# Patient Record
Sex: MALE | Race: WHITE | NOT HISPANIC OR LATINO | Employment: FULL TIME | URBAN - METROPOLITAN AREA
[De-identification: names, ages, dates, MRNs, and addresses within clinical notes are randomized per-mention and may not be internally consistent; named-entity substitution may affect disease eponyms.]

---

## 2020-11-30 ENCOUNTER — OFFICE VISIT (OUTPATIENT)
Dept: PRIMARY CARE | Facility: CLINIC | Age: 35
End: 2020-11-30
Payer: COMMERCIAL

## 2020-11-30 VITALS
DIASTOLIC BLOOD PRESSURE: 86 MMHG | RESPIRATION RATE: 16 BRPM | SYSTOLIC BLOOD PRESSURE: 128 MMHG | OXYGEN SATURATION: 98 % | HEIGHT: 72 IN | HEART RATE: 72 BPM | WEIGHT: 230 LBS | TEMPERATURE: 97.2 F | BODY MASS INDEX: 31.15 KG/M2

## 2020-11-30 DIAGNOSIS — Z00.00 WELLNESS EXAMINATION: ICD-10-CM

## 2020-11-30 DIAGNOSIS — Z23 IMMUNIZATION DUE: Primary | ICD-10-CM

## 2020-11-30 DIAGNOSIS — F98.8 ATTENTION DEFICIT DISORDER, UNSPECIFIED HYPERACTIVITY PRESENCE: ICD-10-CM

## 2020-11-30 DIAGNOSIS — E78.00 PURE HYPERCHOLESTEROLEMIA: ICD-10-CM

## 2020-11-30 PROBLEM — F90.9 ADHD (ATTENTION DEFICIT HYPERACTIVITY DISORDER): Status: ACTIVE | Noted: 2020-11-30

## 2020-11-30 PROCEDURE — 90471 IMMUNIZATION ADMIN: CPT | Performed by: FAMILY MEDICINE

## 2020-11-30 PROCEDURE — 90686 IIV4 VACC NO PRSV 0.5 ML IM: CPT | Performed by: FAMILY MEDICINE

## 2020-11-30 PROCEDURE — 99385 PREV VISIT NEW AGE 18-39: CPT | Mod: 25 | Performed by: FAMILY MEDICINE

## 2020-11-30 RX ORDER — DEXTROAMPHETAMINE SACCHARATE, AMPHETAMINE ASPARTATE MONOHYDRATE, DEXTROAMPHETAMINE SULFATE AND AMPHETAMINE SULFATE 5; 5; 5; 5 MG/1; MG/1; MG/1; MG/1
20 CAPSULE, EXTENDED RELEASE ORAL
COMMUNITY
Start: 2011-12-21 | End: 2021-01-11

## 2020-11-30 RX ORDER — ETODOLAC 400 MG/1
400 TABLET, FILM COATED ORAL
COMMUNITY
Start: 2012-09-11 | End: 2020-11-30 | Stop reason: HOSPADM

## 2021-01-11 ENCOUNTER — TELEMEDICINE (OUTPATIENT)
Dept: PRIMARY CARE | Facility: CLINIC | Age: 36
End: 2021-01-11
Payer: COMMERCIAL

## 2021-01-11 DIAGNOSIS — F90.2 ATTENTION DEFICIT HYPERACTIVITY DISORDER (ADHD), COMBINED TYPE: Primary | ICD-10-CM

## 2021-01-11 PROCEDURE — 99213 OFFICE O/P EST LOW 20 MIN: CPT | Mod: 95 | Performed by: FAMILY MEDICINE

## 2021-01-11 RX ORDER — DEXTROAMPHETAMINE SACCHARATE, AMPHETAMINE ASPARTATE MONOHYDRATE, DEXTROAMPHETAMINE SULFATE AND AMPHETAMINE SULFATE 2.5; 2.5; 2.5; 2.5 MG/1; MG/1; MG/1; MG/1
10 CAPSULE, EXTENDED RELEASE ORAL EVERY MORNING
Qty: 30 CAPSULE | Refills: 0 | Status: SHIPPED | OUTPATIENT
Start: 2021-01-11 | End: 2021-02-25 | Stop reason: SDUPTHER

## 2021-01-11 ASSESSMENT — ENCOUNTER SYMPTOMS
WHEEZING: 0
SORE THROAT: 0
COUGH: 0
PALPITATIONS: 0
ACTIVITY CHANGE: 0
CHILLS: 0
APPETITE CHANGE: 0
FATIGUE: 0
TROUBLE SWALLOWING: 0
EYE ITCHING: 0
EYE PAIN: 0
SINUS PAIN: 0
FEVER: 0
EYE DISCHARGE: 0
VOICE CHANGE: 0
CHEST TIGHTNESS: 0
SHORTNESS OF BREATH: 0
EYE REDNESS: 0
DECREASED CONCENTRATION: 1

## 2021-01-11 NOTE — PROGRESS NOTES
Verification of Patient Location:  The patient affirms they are currently located in the following state: [place]    Request for Consent:    Video Encounter   Yoel, my name is Adrianadoroteo Pete DO.  Before we proceed, can you please verify your identification by telling me your full name and date of birth?  Can you tell me who is in the room with you?    You and I are about to have a telemedicine check-in or visit because you have requested it.  This is a live video-conference.  I am a real person, speaking to you in real time.  There is no one else with me on the video-conference.  However, when we use ([employer], [employer], etc) it is important for you to know that the video-conference may not be secure or private.  I am not recording this conversation and I am asking you not to record it.  This telemedicine visit will be billed to your health insurance or you, if you are self-insured.  You understand you will be responsible for any copayments or coinsurances that apply to your telemedicine visit.  Communication platform used for this encounter:  Integrated Zoom via [employer] Video Visit     Before starting our telemedicine visit, I am required to get your consent for this virtual check-in or visit by telemedicine. Do you consent?      Patient Response to Request for Consent:  Yes      Visit Documentation:  Subjective   ADD    Discussion.     Patient ID: Artur Soni is a 35 y.o. male.  1985      HPI   Pt was seen in late Nov for follow up of ADD med and  Has not been on meds for years and with covid and increased workload at home having more difficulty with getting work done.   Pt has had job changes and little tricks to get him thorugh are not working now.     Also hoping to keep to lowest dose avail that will work.  Pt was on 20mg at one time.     Pt will sign med pact when in office     The following have been reviewed and updated as appropriate in this visit:  Allergies  Meds  Problems        Review of Systems   Constitutional: Negative for activity change, appetite change, chills, fatigue and fever.   HENT: Negative for congestion, ear pain, mouth sores, postnasal drip, sinus pain, sore throat, trouble swallowing and voice change.    Eyes: Negative for pain, discharge, redness, itching and visual disturbance.   Respiratory: Negative for cough, chest tightness, shortness of breath and wheezing.    Cardiovascular: Negative for chest pain and palpitations.   Psychiatric/Behavioral: Positive for decreased concentration.         Assessment/Plan   Diagnoses and all orders for this visit:    Attention deficit hyperactivity disorder (ADHD), combined type (Primary)    Other orders  -     amphetamine-dextroamphetamine XR (ADDERALL XR) 10 mg 24 hr capsule; Take 1 capsule (10 mg total) by mouth every morning.        Time Spent:  I spent 8 minutes on this date of service performing the following activities: providing counseling and education.

## 2021-01-12 NOTE — ASSESSMENT & PLAN NOTE
--discussed with pt and will give trial of adderall xr at 10mg daily.   --will follow up in 4 weeks as recheck.     Pt will sign med pact form once in office as visit.   Pt aware and agree with pan of care

## 2021-02-24 LAB
ALBUMIN SERPL-MCNC: 5 G/DL (ref 4–5)
ALBUMIN/GLOB SERPL: 1.8 {RATIO} (ref 1.2–2.2)
ALP SERPL-CCNC: 93 IU/L (ref 39–117)
ALT SERPL-CCNC: 50 IU/L (ref 0–44)
AST SERPL-CCNC: 30 IU/L (ref 0–40)
BILIRUB SERPL-MCNC: 0.5 MG/DL (ref 0–1.2)
BUN SERPL-MCNC: 17 MG/DL (ref 6–20)
BUN/CREAT SERPL: 17 (ref 9–20)
CALCIUM SERPL-MCNC: 10.2 MG/DL (ref 8.7–10.2)
CHLORIDE SERPL-SCNC: 99 MMOL/L (ref 96–106)
CHOLEST SERPL-MCNC: 241 MG/DL (ref 100–199)
CO2 SERPL-SCNC: 25 MMOL/L (ref 20–29)
CREAT SERPL-MCNC: 1.01 MG/DL (ref 0.76–1.27)
ERYTHROCYTE [DISTWIDTH] IN BLOOD BY AUTOMATED COUNT: 13.1 % (ref 11.6–15.4)
GLOBULIN SER CALC-MCNC: 2.8 G/DL (ref 1.5–4.5)
GLUCOSE SERPL-MCNC: 90 MG/DL (ref 65–99)
HCT VFR BLD AUTO: 46.1 % (ref 37.5–51)
HDLC SERPL-MCNC: 44 MG/DL
HGB BLD-MCNC: 15.4 G/DL (ref 13–17.7)
LAB CORP EGFR IF AFRICN AM: 111 ML/MIN/1.73
LAB CORP EGFR IF NONAFRICN AM: 96 ML/MIN/1.73
LDLC SERPL CALC-MCNC: 165 MG/DL (ref 0–99)
MCH RBC QN AUTO: 28.3 PG (ref 26.6–33)
MCHC RBC AUTO-ENTMCNC: 33.4 G/DL (ref 31.5–35.7)
MCV RBC AUTO: 85 FL (ref 79–97)
PLATELET # BLD AUTO: 250 X10E3/UL (ref 150–450)
POTASSIUM SERPL-SCNC: 4.5 MMOL/L (ref 3.5–5.2)
PROT SERPL-MCNC: 7.8 G/DL (ref 6–8.5)
RBC # BLD AUTO: 5.45 X10E6/UL (ref 4.14–5.8)
SODIUM SERPL-SCNC: 140 MMOL/L (ref 134–144)
TRIGL SERPL-MCNC: 175 MG/DL (ref 0–149)
VLDLC SERPL CALC-MCNC: 32 MG/DL (ref 5–40)
WBC # BLD AUTO: 7.9 X10E3/UL (ref 3.4–10.8)

## 2021-02-25 LAB
APPEARANCE UR: CLEAR
BACTERIA #/AREA URNS HPF: NORMAL /[HPF]
BILIRUB UR QL STRIP: NEGATIVE
COLOR UR: YELLOW
EPI CELLS #/AREA URNS HPF: NORMAL /HPF (ref 0–10)
GLUCOSE UR QL: NEGATIVE
HGB UR QL STRIP: NEGATIVE
KETONES UR QL STRIP: NEGATIVE
LAB CORP URINALYSIS REFLEX: NORMAL
LEUKOCYTE ESTERASE UR QL STRIP: NEGATIVE
MICRO URNS: NORMAL
MICRO URNS: NORMAL
MUCOUS THREADS URNS QL MICRO: PRESENT
NITRITE UR QL STRIP: NEGATIVE
PH UR STRIP: 6 [PH] (ref 5–7.5)
PROT UR QL STRIP: NEGATIVE
RBC #/AREA URNS HPF: NORMAL /HPF (ref 0–2)
SP GR UR: 1.02 (ref 1–1.03)
UROBILINOGEN UR STRIP-MCNC: 0.2 MG/DL (ref 0.2–1)
WBC #/AREA URNS HPF: NORMAL /HPF (ref 0–5)

## 2021-02-25 NOTE — TELEPHONE ENCOUNTER
Medicine Refill Request    Last Office Visit: 11/30/2020  Last Telemedicine Visit: 1/11/2021 Adriana Pete DO    Next Office Visit: Visit date not found  Next Telemedicine Visit: Visit date not found         Current Outpatient Medications:   •  amphetamine-dextroamphetamine XR (ADDERALL XR) 10 mg 24 hr capsule, Take 1 capsule (10 mg total) by mouth every morning., Disp: 30 capsule, Rfl: 0      BP Readings from Last 3 Encounters:   11/30/20 128/86       Recent Lab results:  Lab Results   Component Value Date    CHOL 241 (H) 02/24/2021   ,   Lab Results   Component Value Date    HDL 44 02/24/2021   ,   Lab Results   Component Value Date    LDLCALC 165 (H) 02/24/2021   ,   Lab Results   Component Value Date    TRIG 175 (H) 02/24/2021        Lab Results   Component Value Date    GLUCOSE 90 02/24/2021   , No results found for: HGBA1C      Lab Results   Component Value Date    CREATININE 1.01 02/24/2021       No results found for: TSH

## 2021-02-27 RX ORDER — DEXTROAMPHETAMINE SACCHARATE, AMPHETAMINE ASPARTATE MONOHYDRATE, DEXTROAMPHETAMINE SULFATE AND AMPHETAMINE SULFATE 2.5; 2.5; 2.5; 2.5 MG/1; MG/1; MG/1; MG/1
10 CAPSULE, EXTENDED RELEASE ORAL EVERY MORNING
Qty: 30 CAPSULE | Refills: 0 | Status: SHIPPED | OUTPATIENT
Start: 2021-02-27 | End: 2021-04-19 | Stop reason: SDUPTHER

## 2021-04-20 RX ORDER — DEXTROAMPHETAMINE SACCHARATE, AMPHETAMINE ASPARTATE MONOHYDRATE, DEXTROAMPHETAMINE SULFATE AND AMPHETAMINE SULFATE 2.5; 2.5; 2.5; 2.5 MG/1; MG/1; MG/1; MG/1
10 CAPSULE, EXTENDED RELEASE ORAL EVERY MORNING
Qty: 30 CAPSULE | Refills: 0 | Status: SHIPPED | OUTPATIENT
Start: 2021-04-20 | End: 2021-06-14 | Stop reason: SDUPTHER

## 2021-06-08 ENCOUNTER — HOSPITAL ENCOUNTER (OUTPATIENT)
Facility: CLINIC | Age: 36
Discharge: HOME | End: 2021-06-08
Attending: FAMILY MEDICINE
Payer: COMMERCIAL

## 2021-06-08 VITALS
OXYGEN SATURATION: 95 % | DIASTOLIC BLOOD PRESSURE: 86 MMHG | TEMPERATURE: 98.3 F | HEART RATE: 61 BPM | SYSTOLIC BLOOD PRESSURE: 138 MMHG

## 2021-06-08 DIAGNOSIS — H60.332 ACUTE SWIMMER'S EAR OF LEFT SIDE: Primary | ICD-10-CM

## 2021-06-08 PROCEDURE — 69209 REMOVE IMPACTED EAR WAX UNI: CPT | Mod: 50 | Performed by: NURSE PRACTITIONER

## 2021-06-08 PROCEDURE — 99212 OFFICE O/P EST SF 10 MIN: CPT | Mod: 25 | Performed by: NURSE PRACTITIONER

## 2021-06-08 RX ORDER — NEOMYCIN SULFATE, POLYMYXIN B SULFATE AND HYDROCORTISONE 10; 3.5; 1 MG/ML; MG/ML; [USP'U]/ML
3 SUSPENSION/ DROPS AURICULAR (OTIC) 3 TIMES DAILY
Qty: 4.5 ML | Refills: 0 | Status: SHIPPED | OUTPATIENT
Start: 2021-06-08 | End: 2021-06-18

## 2021-06-08 ASSESSMENT — ENCOUNTER SYMPTOMS
ADENOPATHY: 0
CHEST TIGHTNESS: 0
WEAKNESS: 0
NAUSEA: 0
SHORTNESS OF BREATH: 0
SORE THROAT: 0
SINUS PAIN: 0
BACK PAIN: 0
DIZZINESS: 0
FEVER: 0
SINUS PRESSURE: 0
WHEEZING: 0
CHILLS: 0
DIARRHEA: 0
CONSTIPATION: 0
ABDOMINAL PAIN: 0
RHINORRHEA: 0
HEADACHES: 0
NUMBNESS: 0
VOMITING: 0
COUGH: 0
MYALGIAS: 0

## 2021-06-08 NOTE — ED ATTESTATION NOTE
I was immediately available to provide supervision and direction for the care of the patient.     Callie Purcell MD  06/08/21 [id number]

## 2021-06-08 NOTE — ED PROVIDER NOTES
Emergency Medicine Note  HPI   HISTORY OF PRESENT ILLNESS     36 y/o male c/o b/l ear pain. Reports he was swimming 4 days ago and felt like he got water in his b/l ears. Unrelieved by OTC swimaid and debrox. Denies fever, chills, sore throat, cough, dizziness, ear trauma, or rash. COVID vaccinated.            Patient History   PAST HISTORY     Reviewed from Nursing Triage:      Past Medical History:   Diagnosis Date   • ADHD (attention deficit hyperactivity disorder)        Past Surgical History:   Procedure Laterality Date   • KNEE ARTHROSCOPY W/ MENISCAL REPAIR Right        Family History   Problem Relation Age of Onset   • No Known Problems Biological Mother    • Heart disease Biological Father    • Valvular heart disease Biological Father    • Early death Biological Father    • No Known Problems Biological Sister    • No Known Problems Maternal Grandmother    • Parkinsonism Maternal Grandfather    • Parkinsonism Paternal Grandmother    • Dementia Paternal Grandmother    • No Known Problems Paternal Grandfather    • Heart disease Other        Social History     Tobacco Use   • Smoking status: Never Smoker   • Smokeless tobacco: Never Used   Vaping Use   • Vaping Use: Never used   Substance Use Topics   • Alcohol use: Yes   • Drug use: Never         Review of Systems   REVIEW OF SYSTEMS     Review of Systems   Constitutional: Negative for chills and fever.   HENT: Positive for ear pain. Negative for congestion, ear discharge, postnasal drip, rhinorrhea, sinus pressure, sinus pain and sore throat.    Respiratory: Negative for cough, chest tightness, shortness of breath and wheezing.    Cardiovascular: Negative for chest pain.   Gastrointestinal: Negative for abdominal pain, constipation, diarrhea, nausea and vomiting.   Musculoskeletal: Negative for back pain and myalgias.   Skin: Negative for rash.   Neurological: Negative for dizziness, weakness, numbness and headaches.   Hematological: Negative for adenopathy.    All other systems reviewed and are negative.        VITALS     ED Vitals    Date/Time Temp Pulse Resp BP SpO2 [facility]   06/08/21 1318 36.8 °C (98.3 °F) 61 -- 138/86 95 % MG                       Physical Exam   PHYSICAL EXAM     Physical Exam  Vitals reviewed.   Constitutional:       Appearance: He is well-developed. He is not ill-appearing or toxic-appearing.   HENT:      Head: Normocephalic and atraumatic.      Right Ear: Tympanic membrane and external ear normal. No drainage, swelling or tenderness. There is impacted cerumen. Tympanic membrane is not erythematous or bulging.      Left Ear: Tympanic membrane and external ear normal. Tenderness (ear canal) present. No drainage or swelling. There is impacted cerumen. Tympanic membrane is not erythematous or bulging.      Nose: Nose normal.      Mouth/Throat:      Lips: Pink.      Mouth: Mucous membranes are moist.      Pharynx: Oropharynx is clear. Uvula midline.      Tonsils: 1+ on the right. 1+ on the left.   Cardiovascular:      Rate and Rhythm: Normal rate and regular rhythm.      Heart sounds: No murmur heard.     Pulmonary:      Effort: Pulmonary effort is normal. No respiratory distress.      Breath sounds: Normal breath sounds. No wheezing.   Musculoskeletal:         General: Normal range of motion.      Cervical back: Normal range of motion.   Skin:     General: Skin is warm and dry.      Findings: No rash.   Neurological:      Mental Status: He is alert and oriented to person, place, and time.   Psychiatric:         Behavior: Behavior is cooperative.         Thought Content: Thought content normal.           PROCEDURES     Ear Cerumen Removal    Date/Time: 6/8/2021 1:30 PM  Performed by: Raya Vasquez CRNP  Authorized by: Callie Purcell MD     Consent:     Consent obtained:  Verbal    Consent given by:  Patient    Risks discussed:  Bleeding, dizziness, incomplete removal, infection, pain and TM perforation  Procedure details:     Location:   R ear and L ear    Procedure type: irrigation    Post-procedure details:     Inspection:  TM intact    Hearing quality:  Improved    Patient tolerance of procedure:  Tolerated well, no immediate complications         DATA     Results     None              No orders to display       Scoring tools                                 ED Course & MDM   MDM / ED COURSE and CLINICAL IMPRESSIONS     MDM  Number of Diagnoses or Management Options  Acute swimmer's ear of left side: new and does not require workup  Diagnosis management comments: History and exam consistent with left otitis externa.  Will rx cortisporin drops, 3 gtts TID for 10 days.  Instructed pt to avoid putting objects in ear such as ear buds/qtips, do not swim until infection has cleared.  Recommend tylenol/motrin as needed for pain.  Followup with primary care provider.  Strict instructions given to pt if sx worsen or otherwise concerned to return or visit local ED.  Pt verbalized understanding and agrees with plan of care.      Patient Progress  Patient progress: stable      Clinical Impressions as of Jun 08 1340   Acute swimmer's ear of left side            Raya Vasquez CRNP  06/08/21 1343

## 2021-06-08 NOTE — DISCHARGE INSTRUCTIONS
Administer 4 drops of cortisporin in your left ear 3 times a day for 10 days.  Avoid putting objects in your ear such as ear buds/qtips. Do not swim until infection has cleared.  Take tylenol/motrin as needed for pain.    Followup with your primary care provider.  If your symptoms worsen or otherwise concerned, please visit your local emergency department.    Thank you for choosing [facility]!

## 2021-06-14 NOTE — TELEPHONE ENCOUNTER
PDMP please      Medicine Refill Request    Last Office Visit: 11/30/2020  Last Telemedicine Visit: 1/11/2021 Adriana Pete DO    Next Office Visit: Visit date not found  Next Telemedicine Visit: Visit date not found         Current Outpatient Medications:   •  amphetamine-dextroamphetamine XR (ADDERALL XR) 10 mg 24 hr capsule, Take 1 capsule (10 mg total) by mouth every morning., Disp: 30 capsule, Rfl: 0  •  neomycin-polymyxin-hydrocortisone (CORTISPORIN) 3.5-10,000-1 mg/mL-unit/mL-% otic suspension, Administer 3 drops into the left ear 3 (three) times a day for 10 days., Disp: 4.5 mL, Rfl: 0      BP Readings from Last 3 Encounters:   06/08/21 138/86   11/30/20 128/86       Recent Lab results:  Lab Results   Component Value Date    CHOL 241 (H) 02/24/2021   ,   Lab Results   Component Value Date    HDL 44 02/24/2021   ,   Lab Results   Component Value Date    LDLCALC 165 (H) 02/24/2021   ,   Lab Results   Component Value Date    TRIG 175 (H) 02/24/2021        Lab Results   Component Value Date    GLUCOSE 90 02/24/2021   , No results found for: HGBA1C      Lab Results   Component Value Date    CREATININE 1.01 02/24/2021       No results found for: TSH

## 2021-06-16 RX ORDER — DEXTROAMPHETAMINE SACCHARATE, AMPHETAMINE ASPARTATE MONOHYDRATE, DEXTROAMPHETAMINE SULFATE AND AMPHETAMINE SULFATE 2.5; 2.5; 2.5; 2.5 MG/1; MG/1; MG/1; MG/1
10 CAPSULE, EXTENDED RELEASE ORAL EVERY MORNING
Qty: 30 CAPSULE | Refills: 0 | Status: SHIPPED | OUTPATIENT
Start: 2021-06-16 | End: 2021-08-02 | Stop reason: SDUPTHER

## 2021-08-02 RX ORDER — DEXTROAMPHETAMINE SACCHARATE, AMPHETAMINE ASPARTATE MONOHYDRATE, DEXTROAMPHETAMINE SULFATE AND AMPHETAMINE SULFATE 2.5; 2.5; 2.5; 2.5 MG/1; MG/1; MG/1; MG/1
10 CAPSULE, EXTENDED RELEASE ORAL EVERY MORNING
Qty: 30 CAPSULE | Refills: 0 | Status: SHIPPED | OUTPATIENT
Start: 2021-08-02 | End: 2021-09-23 | Stop reason: SDUPTHER

## 2021-08-02 NOTE — TELEPHONE ENCOUNTER
adderall XR 10mg 06/16/21 #30  Medicine Refill Request    Last Office Visit: 11/30/2020  Last Telemedicine Visit: 1/11/2021 Adriana Pete DO    Next Office Visit: Visit date not found  Next Telemedicine Visit: Visit date not found         Current Outpatient Medications:   •  amphetamine-dextroamphetamine XR (ADDERALL XR) 10 mg 24 hr capsule, Take 1 capsule (10 mg total) by mouth every morning., Disp: 30 capsule, Rfl: 0      BP Readings from Last 3 Encounters:   06/08/21 138/86   11/30/20 128/86       Recent Lab results:  Lab Results   Component Value Date    CHOL 241 (H) 02/24/2021   ,   Lab Results   Component Value Date    HDL 44 02/24/2021   ,   Lab Results   Component Value Date    LDLCALC 165 (H) 02/24/2021   ,   Lab Results   Component Value Date    TRIG 175 (H) 02/24/2021        Lab Results   Component Value Date    GLUCOSE 90 02/24/2021   , No results found for: HGBA1C      Lab Results   Component Value Date    CREATININE 1.01 02/24/2021       No results found for: TSH

## 2021-08-02 NOTE — PROGRESS NOTES
I have queried the state Prescription Drug Monitoring Program [PDMP] and found no suspicious PDMP activity and I will prescribe a controlled medication(s).

## 2021-09-23 RX ORDER — DEXTROAMPHETAMINE SACCHARATE, AMPHETAMINE ASPARTATE MONOHYDRATE, DEXTROAMPHETAMINE SULFATE AND AMPHETAMINE SULFATE 2.5; 2.5; 2.5; 2.5 MG/1; MG/1; MG/1; MG/1
10 CAPSULE, EXTENDED RELEASE ORAL EVERY MORNING
Qty: 30 CAPSULE | Refills: 0 | Status: SHIPPED | OUTPATIENT
Start: 2021-09-23 | End: 2021-11-02 | Stop reason: SDUPTHER

## 2021-09-23 NOTE — TELEPHONE ENCOUNTER
PDMP          Medicine Refill Request    Last Office Visit: 11/30/2020  Last Telemedicine Visit: 1/11/2021 Adriana Pete DO    Next Office Visit: Visit date not found  Next Telemedicine Visit: Visit date not found         Current Outpatient Medications:   •  amphetamine-dextroamphetamine XR (ADDERALL XR) 10 mg 24 hr capsule, Take 1 capsule (10 mg total) by mouth every morning., Disp: 30 capsule, Rfl: 0      BP Readings from Last 3 Encounters:   06/08/21 138/86   11/30/20 128/86       Recent Lab results:  Lab Results   Component Value Date    CHOL 241 (H) 02/24/2021   ,   Lab Results   Component Value Date    HDL 44 02/24/2021   ,   Lab Results   Component Value Date    LDLCALC 165 (H) 02/24/2021   ,   Lab Results   Component Value Date    TRIG 175 (H) 02/24/2021        Lab Results   Component Value Date    GLUCOSE 90 02/24/2021   , No results found for: HGBA1C      Lab Results   Component Value Date    CREATININE 1.01 02/24/2021       No results found for: TSH

## 2021-11-03 RX ORDER — DEXTROAMPHETAMINE SACCHARATE, AMPHETAMINE ASPARTATE MONOHYDRATE, DEXTROAMPHETAMINE SULFATE AND AMPHETAMINE SULFATE 2.5; 2.5; 2.5; 2.5 MG/1; MG/1; MG/1; MG/1
10 CAPSULE, EXTENDED RELEASE ORAL EVERY MORNING
Qty: 30 CAPSULE | Refills: 0 | Status: SHIPPED | OUTPATIENT
Start: 2021-11-03 | End: 2021-12-15 | Stop reason: SDUPTHER

## 2021-11-22 ENCOUNTER — OFFICE VISIT (OUTPATIENT)
Dept: PRIMARY CARE | Facility: CLINIC | Age: 36
End: 2021-11-22
Payer: COMMERCIAL

## 2021-11-22 VITALS
HEART RATE: 64 BPM | WEIGHT: 243 LBS | RESPIRATION RATE: 16 BRPM | DIASTOLIC BLOOD PRESSURE: 78 MMHG | HEIGHT: 72 IN | TEMPERATURE: 98 F | BODY MASS INDEX: 32.91 KG/M2 | SYSTOLIC BLOOD PRESSURE: 134 MMHG | OXYGEN SATURATION: 98 %

## 2021-11-22 DIAGNOSIS — Z00.00 ENCOUNTER FOR GENERAL ADULT MEDICAL EXAMINATION WITHOUT ABNORMAL FINDINGS: Primary | ICD-10-CM

## 2021-11-22 DIAGNOSIS — F90.2 ATTENTION DEFICIT HYPERACTIVITY DISORDER (ADHD), COMBINED TYPE: ICD-10-CM

## 2021-11-22 DIAGNOSIS — E78.2 MIXED HYPERLIPIDEMIA: ICD-10-CM

## 2021-11-22 DIAGNOSIS — K21.9 GASTROESOPHAGEAL REFLUX DISEASE WITHOUT ESOPHAGITIS: ICD-10-CM

## 2021-11-22 PROCEDURE — 3008F BODY MASS INDEX DOCD: CPT | Performed by: FAMILY MEDICINE

## 2021-11-22 PROCEDURE — 90715 TDAP VACCINE 7 YRS/> IM: CPT | Performed by: FAMILY MEDICINE

## 2021-11-22 PROCEDURE — 90472 IMMUNIZATION ADMIN EACH ADD: CPT | Performed by: FAMILY MEDICINE

## 2021-11-22 PROCEDURE — 99395 PREV VISIT EST AGE 18-39: CPT | Mod: 25 | Performed by: FAMILY MEDICINE

## 2021-11-22 PROCEDURE — 90686 IIV4 VACC NO PRSV 0.5 ML IM: CPT | Performed by: FAMILY MEDICINE

## 2021-11-22 PROCEDURE — 90471 IMMUNIZATION ADMIN: CPT | Performed by: FAMILY MEDICINE

## 2021-11-22 ASSESSMENT — ENCOUNTER SYMPTOMS
BLOOD IN STOOL: 0
COUGH: 0
SINUS PRESSURE: 0
PHOTOPHOBIA: 0
COLOR CHANGE: 0
DIARRHEA: 0
NAUSEA: 0
EYE ITCHING: 0
EYE PAIN: 0
APPETITE CHANGE: 0
CONSTIPATION: 0
BACK PAIN: 0
FEVER: 0
PALPITATIONS: 0
DIZZINESS: 0
ACTIVITY CHANGE: 0
EYE DISCHARGE: 0
LIGHT-HEADEDNESS: 0
FREQUENCY: 0
UNEXPECTED WEIGHT CHANGE: 0
AGITATION: 0
DYSURIA: 0
VOICE CHANGE: 0
VOMITING: 1
JOINT SWELLING: 0
CHEST TIGHTNESS: 0
ABDOMINAL DISTENTION: 0
DYSPHORIC MOOD: 0
WEAKNESS: 0
HYPERACTIVE: 0
NERVOUS/ANXIOUS: 0
WHEEZING: 0
HEADACHES: 0
DIFFICULTY URINATING: 0
BRUISES/BLEEDS EASILY: 0
SLEEP DISTURBANCE: 0
SINUS PAIN: 0
MYALGIAS: 0
NUMBNESS: 0
FATIGUE: 0
SHORTNESS OF BREATH: 0
SORE THROAT: 0
EYE REDNESS: 0
ARTHRALGIAS: 0
ABDOMINAL PAIN: 0

## 2021-11-22 ASSESSMENT — PATIENT HEALTH QUESTIONNAIRE - PHQ9: SUM OF ALL RESPONSES TO PHQ9 QUESTIONS 1 & 2: 0

## 2021-11-22 NOTE — ASSESSMENT & PLAN NOTE
Pt did complete b/w recently through his insurance ---states values are improved.   ---will recheck with his values from earlier in year and hoping to improve weight loss and and exercise regimen in the next few mths.     Will forward values

## 2021-11-22 NOTE — PROGRESS NOTES
Adriana Pete D.O.  [facility] in [address], [address]  [place]  [address]  [phone]  Fax [phone]        History of Present Illness   Patient ID: Artur Soni is a 35 y.o. male.    Subjective   EPP    HPI   34 yo here for his physical states overall feeling ok.   No complaints of chest pain or sob.   States has been crazy with wife as has n/born at home--has had some weight gain and noticed more reflux.   --stom has not been great---  Did have 2 episodes of vomitting about a mth apart but daughter alsos tarted  and unsure if related.  No symptoms of nausea/vomiitng or diarrhea at thist yaakov.     Biometric screening done by life insurance. Values were improved per patient         Past Medical/Surgical/Family/Social History     The following have been reviewed and updated as appropriate in this visit:  Meds  Problems         Past Medical History:   Diagnosis Date   • ADHD (attention deficit hyperactivity disorder)        Past Surgical History:   Procedure Laterality Date   • KNEE ARTHROSCOPY W/ MENISCAL REPAIR Right        Family History   Problem Relation Age of Onset   • No Known Problems Biological Mother    • Heart disease Biological Father    • Valvular heart disease Biological Father    • Early death Biological Father    • No Known Problems Biological Sister    • No Known Problems Maternal Grandmother    • Parkinsonism Maternal Grandfather    • Parkinsonism Paternal Grandmother    • Dementia Paternal Grandmother    • No Known Problems Paternal Grandfather    • Heart disease Mother's Sister    • No Known Problems Biological Son    • No Known Problems Biological Daughter        Social History     Socioeconomic History   • Marital status: Single     Spouse name: Not on file   • Number of children: 2   • Years of education: Not on file   • Highest education level: Not on file   Occupational History   • Occupation:    Tobacco Use   •  Smoking status: Never Smoker   • Smokeless tobacco: Never Used   Vaping Use   • Vaping Use: Never used   Substance and Sexual Activity   • Alcohol use: Yes     Alcohol/week: 5.0 standard drinks     Types: 5 Standard drinks or equivalent per week     Comment: Socially, about 5 drinks per week   • Drug use: Never   • Sexual activity: Yes     Partners: Female     Birth control/protection: Condom   Other Topics Concern   • Not on file   Social History Narrative   • Not on file     Social Determinants of Health     Financial Resource Strain:    • Difficulty of Paying Living Expenses: Not on file   Food Insecurity:    • Worried About Running Out of Food in the Last Year: Not on file   • Ran Out of Food in the Last Year: Not on file   Transportation Needs:    • Lack of Transportation (Medical): Not on file   • Lack of Transportation (Non-Medical): Not on file   Physical Activity:    • Days of Exercise per Week: Not on file   • Minutes of Exercise per Session: Not on file   Stress:    • Feeling of Stress : Not on file   Social Connections:    • Frequency of Communication with Friends and Family: Not on file   • Frequency of Social Gatherings with Friends and Family: Not on file   • Attends [employer] Services: Not on file   • Active Member of Clubs or Organizations: Not on file   • Attends Club or Organization Meetings: Not on file   • Marital Status: Not on file   Intimate Partner Violence:    • Fear of Current or Ex-Partner: Not on file   • Emotionally Abused: Not on file   • Physically Abused: Not on file   • Sexually Abused: Not on file   Housing Stability:    • Unable to Pay for Housing in the Last Year: Not on file   • Number of Places Lived in the Last Year: Not on file   • Unstable Housing in the Last Year: Not on file      Allergies and Medications     No Known Allergies      Current Outpatient Medications:   •  amphetamine-dextroamphetamine XR 10 mg 24 hr capsule, Take 1 capsule (10 mg total) by mouth every  "morning., Disp: 30 capsule, Rfl: 0   Review of Systems       Review of Systems   Constitutional: Negative for activity change, appetite change, fatigue, fever and unexpected weight change.   HENT: Negative for congestion, ear discharge, ear pain, hearing loss, postnasal drip, sinus pressure, sinus pain, sore throat and voice change.    Eyes: Negative for photophobia, pain, discharge, redness, itching and visual disturbance.   Respiratory: Negative for cough, chest tightness, shortness of breath and wheezing.    Cardiovascular: Negative for chest pain and palpitations.   Gastrointestinal: Positive for vomiting. Negative for abdominal distention, abdominal pain, blood in stool, constipation, diarrhea and nausea.        Now resolved.     +reflux   Endocrine: Negative for cold intolerance, heat intolerance and polyuria.   Genitourinary: Negative for difficulty urinating, dysuria, frequency, genital sores, penile discharge, penile pain, penile swelling, scrotal swelling and testicular pain.   Musculoskeletal: Negative for arthralgias, back pain, joint swelling and myalgias.   Skin: Negative for color change and rash.   Allergic/Immunologic: Negative for environmental allergies and food allergies.   Neurological: Negative for dizziness, weakness, light-headedness, numbness and headaches.   Hematological: Does not bruise/bleed easily.   Psychiatric/Behavioral: Negative for agitation, behavioral problems, dysphoric mood, sleep disturbance and suicidal ideas. The patient is not nervous/anxious and is not hyperactive.       Physical Examination       Objective     Vitals:    11/22/21 0808   BP: 134/78   Pulse: 64   Resp: 16   Temp: 36.7 °C (98 °F)   SpO2: 98%       Vitals:    11/22/21 0808   BP: 134/78   BP Location: Left upper arm   Patient Position: Sitting   Pulse: 64   Resp: 16   Temp: 36.7 °C (98 °F)   TempSrc: Temporal   SpO2: 98%   Weight: 110 kg (243 lb)   Height: 1.816 m (5' 11.5\")       Wt Readings from Last 3 " "Encounters:   11/22/21 110 kg (243 lb)   11/30/20 104 kg (230 lb)       Ht Readings from Last 3 Encounters:   11/22/21 1.816 m (5' 11.5\")   11/30/20 1.829 m (6')       BP Readings from Last 3 Encounters:   11/22/21 134/78   06/08/21 138/86   11/30/20 128/86       Physical Exam  Vitals and nursing note reviewed.   Constitutional:       Appearance: Normal appearance.   HENT:      Head: Normocephalic and atraumatic.      Right Ear: Tympanic membrane, ear canal and external ear normal.      Left Ear: Tympanic membrane, ear canal and external ear normal.      Nose: Nose normal.      Mouth/Throat:      Mouth: Mucous membranes are moist.      Pharynx: Oropharynx is clear.   Eyes:      Extraocular Movements: Extraocular movements intact.      Conjunctiva/sclera: Conjunctivae normal.      Pupils: Pupils are equal, round, and reactive to light.   Cardiovascular:      Rate and Rhythm: Normal rate and regular rhythm.      Pulses: Normal pulses.      Heart sounds: Normal heart sounds.   Pulmonary:      Effort: Pulmonary effort is normal.      Breath sounds: Normal breath sounds.   Abdominal:      General: Abdomen is flat. Bowel sounds are normal.      Palpations: Abdomen is soft.   Musculoskeletal:         General: Normal range of motion.      Cervical back: Normal range of motion and neck supple.   Skin:     General: Skin is warm and dry.      Capillary Refill: Capillary refill takes less than 2 seconds.   Neurological:      General: No focal deficit present.      Mental Status: He is alert and oriented to person, place, and time.   Psychiatric:         Mood and Affect: Mood normal.         Behavior: Behavior normal.         Thought Content: Thought content normal.         Judgment: Judgment normal.        Laboratory Results     Lab Results   Component Value Date    WBC 7.9 02/24/2021    HGB 15.4 02/24/2021    HCT 46.1 02/24/2021    MCV 85 02/24/2021     02/24/2021          Chemistry        Component Value Date/Time    "  02/24/2021 0930    K 4.5 02/24/2021 0930    CL 99 02/24/2021 0930    CO2 25 02/24/2021 0930    BUN 17 02/24/2021 0930    CREATININE 1.01 02/24/2021 0930        Component Value Date/Time    ALKPHOS 93 02/24/2021 0930    AST 30 02/24/2021 0930    ALT 50 (H) 02/24/2021 0930    BILITOT 0.5 02/24/2021 0930            Lab Results   Component Value Date    GLUCOSE 90 02/24/2021     02/24/2021    K 4.5 02/24/2021    CO2 25 02/24/2021    CL 99 02/24/2021    BUN 17 02/24/2021    CREATININE 1.01 02/24/2021       Lab Results   Component Value Date    CHOL 241 (H) 02/24/2021     Lab Results   Component Value Date    HDL 44 02/24/2021     Lab Results   Component Value Date    LDLCALC 165 (H) 02/24/2021     Lab Results   Component Value Date    TRIG 175 (H) 02/24/2021     No results found for: CHOLHDL    No results found for: TSH    No results found for: HGBA1C    No results found for: PSA    No results found for: HEPCAB      Immunization History   Administered Date(s) Administered   • Influenza TIV (IM) 11/11/2011   • Influenza Vaccine Quadrivalent Preservative Free 6 Mons and Up 11/30/2020   • Influenza Vaccine Quadrivalent Preservative Free 6-35 Months 10/14/2017, 08/14/2018   • Influenza, Unspecified 11/02/2015, 11/30/2020   • Sars-cov-2 (Covid-19) Vaccine, Pfizer 01/29/2021, 02/20/2021   • Tdap 01/01/2013, 08/14/2018          Assessment and Plan       Assessment/Plan     Problem List Items Addressed This Visit        Digestive    Gastroesophageal reflux disease without esophagitis    Current Assessment & Plan     ---pt has had 2 episodes--of vomitting after meal, notices more reflux--will give trial of prilosec otc-- 2 weeks, then prn            Endocrine/Metabolic    Mixed hyperlipidemia    Overview      2/2016    Diet: no regular red meat; +cheese, no regular fried/fast food; does not cook with butter at home; not much full fat dairy  Exercise: ice hockey and running         Current Assessment & Plan      Pt did complete b/w recently through his insurance ---states values are improved.   ---will recheck with his values from earlier in year and hoping to improve weight loss and and exercise regimen in the next few mths.     Will forward values            Other    ADHD (attention deficit hyperactivity disorder)    Current Assessment & Plan     chornic problem and stable on meds         Encounter for general adult medical examination without abnormal findings - Primary    Current Assessment & Plan     Normal Exam.  Updated interval history.  Reviewed medications, allergies, PMH,FH,alcohol/tobacco/drug use.  Diet and exercise counseling given.     -Age appropriate health Maintenance reviewed.    -Immunizations - will updated tdap due to new baby, flu today  --aware to booster covid in 2 weeks if chooses.     - Counseling regarding healthy eating habits and a diet low in saturated fats was given.      --Exercise counseling given.     Patient verbalizes an understnding of the treatment plan discussed at today's visit.                No follow-ups on file.    Orders Placed This Encounter   Procedures   • Influenza vaccine quadrivalent preservative free 6 mon and older IM (FluLaval)   • Tdap vaccine greater than or equal to 6yo IM          Adriana Pete DO  11/22/2021

## 2021-11-22 NOTE — ASSESSMENT & PLAN NOTE
Normal Exam.  Updated interval history.  Reviewed medications, allergies, PMH,FH,alcohol/tobacco/drug use.  Diet and exercise counseling given.     -Age appropriate health Maintenance reviewed.    -Immunizations - will updated tdap due to new baby, flu today  --aware to booster covid in 2 weeks if chooses.     - Counseling regarding healthy eating habits and a diet low in saturated fats was given.      --Exercise counseling given.     Patient verbalizes an understnding of the treatment plan discussed at today's visit.

## 2021-11-22 NOTE — ASSESSMENT & PLAN NOTE
---pt has had 2 episodes--of vomitting after meal, notices more reflux--will give trial of prilosec otc-- 2 weeks, then prn

## 2021-12-15 RX ORDER — DEXTROAMPHETAMINE SACCHARATE, AMPHETAMINE ASPARTATE MONOHYDRATE, DEXTROAMPHETAMINE SULFATE AND AMPHETAMINE SULFATE 2.5; 2.5; 2.5; 2.5 MG/1; MG/1; MG/1; MG/1
10 CAPSULE, EXTENDED RELEASE ORAL EVERY MORNING
Qty: 30 CAPSULE | Refills: 0 | Status: SHIPPED | OUTPATIENT
Start: 2021-12-15 | End: 2022-01-31 | Stop reason: SDUPTHER

## 2021-12-15 NOTE — TELEPHONE ENCOUNTER
Medicine Refill Request    Last Office: 11/22/2021   Last Telemedicine Visit: 1/11/2021 Adriana Pete DO  Last Consult Visit: Visit date not found    Next Office Visit: Visit date not found  Next Telemedicine Visit: Visit date not found         Current Outpatient Medications:   •  amphetamine-dextroamphetamine XR 10 mg 24 hr capsule, Take 1 capsule (10 mg total) by mouth every morning., Disp: 30 capsule, Rfl: 0      BP Readings from Last 3 Encounters:   11/22/21 134/78   06/08/21 138/86   11/30/20 128/86       Recent Lab results:  Lab Results   Component Value Date    CHOL 241 (H) 02/24/2021   ,   Lab Results   Component Value Date    HDL 44 02/24/2021   ,   Lab Results   Component Value Date    LDLCALC 165 (H) 02/24/2021   ,   Lab Results   Component Value Date    TRIG 175 (H) 02/24/2021        Lab Results   Component Value Date    GLUCOSE 90 02/24/2021   , No results found for: HGBA1C      Lab Results   Component Value Date    CREATININE 1.01 02/24/2021       No results found for: TSH

## 2022-01-31 RX ORDER — DEXTROAMPHETAMINE SACCHARATE, AMPHETAMINE ASPARTATE MONOHYDRATE, DEXTROAMPHETAMINE SULFATE AND AMPHETAMINE SULFATE 2.5; 2.5; 2.5; 2.5 MG/1; MG/1; MG/1; MG/1
10 CAPSULE, EXTENDED RELEASE ORAL EVERY MORNING
Qty: 30 CAPSULE | Refills: 0 | Status: SHIPPED | OUTPATIENT
Start: 2022-01-31 | End: 2022-03-11 | Stop reason: SDUPTHER

## 2022-01-31 NOTE — TELEPHONE ENCOUNTER
PDMP        Medicine Refill Request    Last Office: 11/22/2021   Last Consult Visit: Visit date not found  Last Telemedicine Visit: 1/11/2021 Adriana Pete DO    Next Appointment: Visit date not found      Current Outpatient Medications:   •  amphetamine-dextroamphetamine XR 10 mg 24 hr capsule, Take 1 capsule (10 mg total) by mouth every morning., Disp: 30 capsule, Rfl: 0      BP Readings from Last 3 Encounters:   11/22/21 134/78   06/08/21 138/86   11/30/20 128/86       Recent Lab results:  Lab Results   Component Value Date    CHOL 241 (H) 02/24/2021   ,   Lab Results   Component Value Date    HDL 44 02/24/2021   ,   Lab Results   Component Value Date    LDLCALC 165 (H) 02/24/2021   ,   Lab Results   Component Value Date    TRIG 175 (H) 02/24/2021        Lab Results   Component Value Date    GLUCOSE 90 02/24/2021   , No results found for: HGBA1C      Lab Results   Component Value Date    CREATININE 1.01 02/24/2021       No results found for: TSH

## 2022-03-11 NOTE — TELEPHONE ENCOUNTER
PDMP            Medicine Refill Request    Last Office: 11/22/2021   Last Consult Visit: Visit date not found  Last Telemedicine Visit: 1/11/2021 Adriana Pete DO    Next Appointment: Visit date not found      Current Outpatient Medications:   •  amphetamine-dextroamphetamine XR (ADDERALL XR) 10 mg 24 hr capsule, Take 1 capsule (10 mg total) by mouth every morning., Disp: 30 capsule, Rfl: 0      BP Readings from Last 3 Encounters:   11/22/21 134/78   06/08/21 138/86   11/30/20 128/86       Recent Lab results:  Lab Results   Component Value Date    CHOL 241 (H) 02/24/2021   ,   Lab Results   Component Value Date    HDL 44 02/24/2021   ,   Lab Results   Component Value Date    LDLCALC 165 (H) 02/24/2021   ,   Lab Results   Component Value Date    TRIG 175 (H) 02/24/2021        Lab Results   Component Value Date    GLUCOSE 90 02/24/2021   , No results found for: HGBA1C      Lab Results   Component Value Date    CREATININE 1.01 02/24/2021       No results found for: TSH

## 2022-03-13 RX ORDER — DEXTROAMPHETAMINE SACCHARATE, AMPHETAMINE ASPARTATE MONOHYDRATE, DEXTROAMPHETAMINE SULFATE AND AMPHETAMINE SULFATE 2.5; 2.5; 2.5; 2.5 MG/1; MG/1; MG/1; MG/1
10 CAPSULE, EXTENDED RELEASE ORAL EVERY MORNING
Qty: 30 CAPSULE | Refills: 0 | Status: SHIPPED | OUTPATIENT
Start: 2022-03-13 | End: 2022-05-06 | Stop reason: SDUPTHER

## 2022-05-09 RX ORDER — DEXTROAMPHETAMINE SACCHARATE, AMPHETAMINE ASPARTATE MONOHYDRATE, DEXTROAMPHETAMINE SULFATE AND AMPHETAMINE SULFATE 2.5; 2.5; 2.5; 2.5 MG/1; MG/1; MG/1; MG/1
10 CAPSULE, EXTENDED RELEASE ORAL EVERY MORNING
Qty: 30 CAPSULE | Refills: 0 | Status: SHIPPED | OUTPATIENT
Start: 2022-05-09 | End: 2022-06-23 | Stop reason: SDUPTHER

## 2022-06-25 RX ORDER — DEXTROAMPHETAMINE SACCHARATE, AMPHETAMINE ASPARTATE MONOHYDRATE, DEXTROAMPHETAMINE SULFATE AND AMPHETAMINE SULFATE 2.5; 2.5; 2.5; 2.5 MG/1; MG/1; MG/1; MG/1
10 CAPSULE, EXTENDED RELEASE ORAL EVERY MORNING
Qty: 30 CAPSULE | Refills: 0 | Status: SHIPPED | OUTPATIENT
Start: 2022-06-25 | End: 2022-07-25 | Stop reason: SDUPTHER

## 2022-07-25 ENCOUNTER — OFFICE VISIT (OUTPATIENT)
Dept: PRIMARY CARE | Facility: CLINIC | Age: 37
End: 2022-07-25
Payer: COMMERCIAL

## 2022-07-25 VITALS
WEIGHT: 233.2 LBS | BODY MASS INDEX: 31.59 KG/M2 | DIASTOLIC BLOOD PRESSURE: 84 MMHG | OXYGEN SATURATION: 100 % | RESPIRATION RATE: 12 BRPM | TEMPERATURE: 97.2 F | HEART RATE: 64 BPM | HEIGHT: 72 IN | SYSTOLIC BLOOD PRESSURE: 124 MMHG

## 2022-07-25 DIAGNOSIS — E78.2 MIXED HYPERLIPIDEMIA: ICD-10-CM

## 2022-07-25 DIAGNOSIS — F90.2 ATTENTION DEFICIT HYPERACTIVITY DISORDER (ADHD), COMBINED TYPE: Primary | ICD-10-CM

## 2022-07-25 PROBLEM — K21.9 GASTROESOPHAGEAL REFLUX DISEASE WITHOUT ESOPHAGITIS: Status: RESOLVED | Noted: 2021-11-22 | Resolved: 2022-07-25

## 2022-07-25 PROCEDURE — 99213 OFFICE O/P EST LOW 20 MIN: CPT | Performed by: FAMILY MEDICINE

## 2022-07-25 PROCEDURE — 3008F BODY MASS INDEX DOCD: CPT | Performed by: FAMILY MEDICINE

## 2022-07-25 RX ORDER — DEXTROAMPHETAMINE SACCHARATE, AMPHETAMINE ASPARTATE MONOHYDRATE, DEXTROAMPHETAMINE SULFATE AND AMPHETAMINE SULFATE 2.5; 2.5; 2.5; 2.5 MG/1; MG/1; MG/1; MG/1
10 CAPSULE, EXTENDED RELEASE ORAL EVERY MORNING
Qty: 30 CAPSULE | Refills: 0 | Status: SHIPPED | OUTPATIENT
Start: 2022-07-25 | End: 2022-09-06 | Stop reason: SDUPTHER

## 2022-07-25 RX ORDER — DICLOFENAC SODIUM 75 MG/1
75 TABLET, DELAYED RELEASE ORAL 2 TIMES DAILY
COMMUNITY
Start: 2022-06-30 | End: 2023-02-06

## 2022-07-25 NOTE — PROGRESS NOTES
Adriana Pete D.O.  [facility] in [address], [address]  [place]  [address]  [phone]  Fax [phone]        History of Present Illness   Patient ID: Artur Soni is a 36 y.o. male.    Subjective  Pt here for follow up  HPI  +lost weight  On whole 30 and did well, minimal episodes of reflux--pthad done well with dietary changes.  -pt well controlled on medication    Had boot on had grade 2 sprain  --2 weeks ago seems to be getting better     Past Medical/Surgical/Family/Social History     The following have been reviewed and updated as appropriate in this visit:   Allergies  Meds  Problems           Past Medical History:   Diagnosis Date   • ADHD (attention deficit hyperactivity disorder)    • Gastroesophageal reflux disease without esophagitis 11/22/2021       Past Surgical History:   Procedure Laterality Date   • KNEE ARTHROSCOPY W/ MENISCAL REPAIR Right        Family History   Problem Relation Age of Onset   • No Known Problems Biological Mother    • Heart disease Biological Father    • Valvular heart disease Biological Father    • Early death Biological Father    • No Known Problems Biological Sister    • No Known Problems Maternal Grandmother    • Parkinsonism Maternal Grandfather    • Parkinsonism Paternal Grandmother    • Dementia Paternal Grandmother    • No Known Problems Paternal Grandfather    • Heart disease Mother's Sister    • No Known Problems Biological Son    • No Known Problems Biological Daughter        Social History     Socioeconomic History   • Marital status: Single     Spouse name: Not on file   • Number of children: 2   • Years of education: Not on file   • Highest education level: Not on file   Occupational History   • Occupation:    Tobacco Use   • Smoking status: Never Smoker   • Smokeless tobacco: Never Used   Vaping Use   • Vaping Use: Never used   Substance and Sexual Activity   • Alcohol use: Yes      Alcohol/week: 5.0 standard drinks     Types: 5 Standard drinks or equivalent per week     Comment: Socially, about 5 drinks per week   • Drug use: Never   • Sexual activity: Yes     Partners: Female     Birth control/protection: Condom   Other Topics Concern   • Not on file   Social History Narrative   • Not on file     Social Determinants of Health     Financial Resource Strain: Not on file   Food Insecurity: Not on file   Transportation Needs: Not on file   Physical Activity: Not on file   Stress: Not on file   Social Connections: Not on file   Intimate Partner Violence: Not on file   Housing Stability: Not on file      Allergies and Medications     No Known Allergies      Current Outpatient Medications:   •  amphetamine-dextroamphetamine XR (ADDERALL XR) 10 mg 24 hr capsule, Take 1 capsule (10 mg total) by mouth every morning., Disp: 30 capsule, Rfl: 0  •  diclofenac (VOLTAREN) 75 mg EC tablet, Take 75 mg by mouth 2 (two) times a day., Disp: , Rfl:    Review of Systems       Review of Systems   Constitutional: Negative for activity change, appetite change, chills, fatigue and fever.   HENT: Negative for congestion, ear pain, mouth sores, postnasal drip, sinus pain, sore throat, trouble swallowing and voice change.    Eyes: Negative for pain, discharge, redness, itching and visual disturbance.   Respiratory: Negative for cough, chest tightness, shortness of breath and wheezing.    Cardiovascular: Negative for chest pain and palpitations.   Musculoskeletal: Positive for joint swelling.        Ankle swelling   Psychiatric/Behavioral: Positive for decreased concentration.      Physical Examination       Objective     Vitals:    07/25/22 1755   BP: 124/84   Pulse: 64   Resp: 12   Temp: 36.2 °C (97.2 °F)   SpO2: 100%       Vitals:    07/25/22 1755   BP: 124/84   Pulse: 64   Resp: 12   Temp: 36.2 °C (97.2 °F)   SpO2: 100%   Weight: 106 kg (233 lb 3.2 oz)   Height: 1.829 m (6')       Wt Readings from Last 3 Encounters:  "  07/25/22 106 kg (233 lb 3.2 oz)   11/22/21 110 kg (243 lb)   11/30/20 104 kg (230 lb)       Ht Readings from Last 3 Encounters:   07/25/22 1.829 m (6')   11/22/21 1.816 m (5' 11.5\")   11/30/20 1.829 m (6')       BP Readings from Last 3 Encounters:   07/25/22 124/84   11/22/21 134/78   06/08/21 138/86       Physical Exam  Vitals and nursing note reviewed.   Constitutional:       Appearance: Normal appearance. He is well-developed.   HENT:      Head: Normocephalic and atraumatic.      Right Ear: External ear normal.      Left Ear: External ear normal.      Mouth/Throat:      Mouth: Mucous membranes are moist.      Pharynx: Oropharynx is clear.   Eyes:      Extraocular Movements: Extraocular movements intact.      Conjunctiva/sclera: Conjunctivae normal.      Pupils: Pupils are equal, round, and reactive to light.   Cardiovascular:      Rate and Rhythm: Normal rate and regular rhythm.      Heart sounds: Normal heart sounds.   Pulmonary:      Effort: Pulmonary effort is normal.      Breath sounds: Normal breath sounds.   Musculoskeletal:      Cervical back: Normal range of motion and neck supple.   Skin:     General: Skin is warm and dry.   Neurological:      Mental Status: He is alert.   Psychiatric:         Mood and Affect: Mood normal.         Behavior: Behavior normal.         Thought Content: Thought content normal.         Judgment: Judgment normal.        Laboratory Results     Lab Results   Component Value Date    WBC 7.9 02/24/2021    HGB 15.4 02/24/2021    HCT 46.1 02/24/2021    MCV 85 02/24/2021     02/24/2021          Chemistry        Component Value Date/Time     02/24/2021 0930    K 4.5 02/24/2021 0930    CL 99 02/24/2021 0930    CO2 25 02/24/2021 0930    BUN 17 02/24/2021 0930    CREATININE 1.01 02/24/2021 0930        Component Value Date/Time    ALKPHOS 93 02/24/2021 0930    AST 30 02/24/2021 0930    ALT 50 (H) 02/24/2021 0930    BILITOT 0.5 02/24/2021 0930            Lab Results "   Component Value Date    GLUCOSE 90 02/24/2021     02/24/2021    K 4.5 02/24/2021    CO2 25 02/24/2021    CL 99 02/24/2021    BUN 17 02/24/2021    CREATININE 1.01 02/24/2021       Lab Results   Component Value Date    CHOL 241 (H) 02/24/2021     Lab Results   Component Value Date    HDL 44 02/24/2021     Lab Results   Component Value Date    LDLCALC 165 (H) 02/24/2021     Lab Results   Component Value Date    TRIG 175 (H) 02/24/2021     No results found for: CHOLHDL    No results found for: TSH    No results found for: HGBA1C    No results found for: PSA    No results found for: HEPCAB      Immunization History   Administered Date(s) Administered   • Influenza TIV (IM) 11/11/2011   • Influenza Vaccine Quadrivalent Preservative Free 6 Mons and Up 11/30/2020, 11/22/2021   • Influenza Vaccine Quadrivalent Preservative Free 6-35 Months 10/14/2017, 08/14/2018   • Influenza, Unspecified 11/02/2015, 11/30/2020   • Sars-cov-2 (Covid-19) Vaccine, Pfizer 01/29/2021, 02/20/2021   • Tdap 01/01/2013, 08/14/2018, 11/22/2021          Assessment and Plan       Assessment/Plan     Problem List Items Addressed This Visit        Endocrine/Metabolic    Mixed hyperlipidemia    Overview      2/2016    Diet: no regular red meat; +cheese, no regular fried/fast food; does not cook with butter at home; not much full fat dairy  Exercise: ice hockey and running           Current Assessment & Plan     --chronic--will be due for updated labs  Continue on routine diet           Relevant Orders    Comprehensive metabolic panel    Lipid panel       Other    ADHD (attention deficit hyperactivity disorder) - Primary    Current Assessment & Plan     chronic and under treatment with medication and well controlled.  No change in medication           Relevant Medications    amphetamine-dextroamphetamine XR (ADDERALL XR) 10 mg 24 hr capsule          No follow-ups on file.    Orders Placed This Encounter   Procedures   • Comprehensive  metabolic panel     Standing Status:   Future     Number of Occurrences:   1     Standing Expiration Date:   7/25/2023     Order Specific Question:   Release to patient     Answer:   Immediate   • Lipid panel     Standing Status:   Future     Number of Occurrences:   1     Standing Expiration Date:   7/25/2023     Order Specific Question:   Release to patient     Answer:   Immediate          Adriana Pete DO  8/13/2022

## 2022-08-13 ASSESSMENT — ENCOUNTER SYMPTOMS
VOICE CHANGE: 0
JOINT SWELLING: 1
EYE PAIN: 0
SINUS PAIN: 0
TROUBLE SWALLOWING: 0
EYE REDNESS: 0
CHILLS: 0
EYE ITCHING: 0
FATIGUE: 0
COUGH: 0
PALPITATIONS: 0
EYE DISCHARGE: 0
CHEST TIGHTNESS: 0
ACTIVITY CHANGE: 0
DECREASED CONCENTRATION: 1
APPETITE CHANGE: 0
FEVER: 0
SHORTNESS OF BREATH: 0
WHEEZING: 0
SORE THROAT: 0

## 2022-09-06 NOTE — TELEPHONE ENCOUNTER
Medicine Refill Request    07/25/2022 07/25/2022 Adderall Xr (Capsule, Extended Release) 30.0 30 10 MG NA NORMA PETE   06/25/2022 06/25/2022 Adderall Xr (Capsule, Extended Release) 30.0 30 10 MG NA NORMA PETE      Last Office: 7/25/2022   Last Consult Visit: Visit date not found  Last Telemedicine Visit: 1/11/2021 Norma Pete DO    Next Appointment: Visit date not found      Current Outpatient Medications:     amphetamine-dextroamphetamine XR (ADDERALL XR) 10 mg 24 hr capsule, Take 1 capsule (10 mg total) by mouth every morning., Disp: 30 capsule, Rfl: 0    diclofenac (VOLTAREN) 75 mg EC tablet, Take 75 mg by mouth 2 (two) times a day., Disp: , Rfl:       BP Readings from Last 3 Encounters:   07/25/22 124/84   11/22/21 134/78   06/08/21 138/86       Recent Lab results:  Lab Results   Component Value Date    CHOL 241 (H) 02/24/2021   ,   Lab Results   Component Value Date    HDL 44 02/24/2021   ,   Lab Results   Component Value Date    LDLCALC 165 (H) 02/24/2021   ,   Lab Results   Component Value Date    TRIG 175 (H) 02/24/2021        Lab Results   Component Value Date    GLUCOSE 90 02/24/2021   , No results found for: HGBA1C      Lab Results   Component Value Date    CREATININE 1.01 02/24/2021       No results found for: TSH

## 2022-09-07 RX ORDER — DEXTROAMPHETAMINE SACCHARATE, AMPHETAMINE ASPARTATE MONOHYDRATE, DEXTROAMPHETAMINE SULFATE AND AMPHETAMINE SULFATE 2.5; 2.5; 2.5; 2.5 MG/1; MG/1; MG/1; MG/1
10 CAPSULE, EXTENDED RELEASE ORAL EVERY MORNING
Qty: 30 CAPSULE | Refills: 0 | Status: SHIPPED | OUTPATIENT
Start: 2022-09-07 | End: 2022-10-17 | Stop reason: SDUPTHER

## 2022-10-05 ENCOUNTER — APPOINTMENT (OUTPATIENT)
Dept: URBAN - METROPOLITAN AREA CLINIC 203 | Age: 37
Setting detail: DERMATOLOGY
End: 2022-10-12

## 2022-10-05 DIAGNOSIS — L73.8 OTHER SPECIFIED FOLLICULAR DISORDERS: ICD-10-CM

## 2022-10-05 DIAGNOSIS — L57.8 OTHER SKIN CHANGES DUE TO CHRONIC EXPOSURE TO NONIONIZING RADIATION: ICD-10-CM

## 2022-10-05 DIAGNOSIS — D18.0 HEMANGIOMA: ICD-10-CM

## 2022-10-05 DIAGNOSIS — Z11.52 ENCOUNTER FOR SCREENING FOR COVID-19: ICD-10-CM

## 2022-10-05 DIAGNOSIS — D22 MELANOCYTIC NEVI: ICD-10-CM

## 2022-10-05 DIAGNOSIS — L81.4 OTHER MELANIN HYPERPIGMENTATION: ICD-10-CM

## 2022-10-05 PROBLEM — D18.01 HEMANGIOMA OF SKIN AND SUBCUTANEOUS TISSUE: Status: ACTIVE | Noted: 2022-10-05

## 2022-10-05 PROBLEM — D22.5 MELANOCYTIC NEVI OF TRUNK: Status: ACTIVE | Noted: 2022-10-05

## 2022-10-05 PROCEDURE — OTHER SCREENING FOR COVID-19: OTHER

## 2022-10-05 PROCEDURE — OTHER COUNSELING: OTHER

## 2022-10-05 PROCEDURE — OTHER REASSURANCE: OTHER

## 2022-10-05 PROCEDURE — OTHER SUNSCREEN RECOMMENDATIONS: OTHER

## 2022-10-05 PROCEDURE — OTHER MIPS QUALITY: OTHER

## 2022-10-05 PROCEDURE — 99203 OFFICE O/P NEW LOW 30 MIN: CPT

## 2022-10-05 ASSESSMENT — LOCATION DETAILED DESCRIPTION DERM
LOCATION DETAILED: RIGHT LATERAL ABDOMEN
LOCATION DETAILED: LEFT MEDIAL SUPERIOR CHEST
LOCATION DETAILED: LEFT SUPERIOR LATERAL UPPER BACK
LOCATION DETAILED: EPIGASTRIC SKIN
LOCATION DETAILED: RIGHT INFERIOR CENTRAL MALAR CHEEK
LOCATION DETAILED: LEFT MEDIAL INFERIOR CHEST
LOCATION DETAILED: RIGHT SUPERIOR UPPER BACK

## 2022-10-05 ASSESSMENT — LOCATION ZONE DERM
LOCATION ZONE: FACE
LOCATION ZONE: TRUNK

## 2022-10-05 ASSESSMENT — LOCATION SIMPLE DESCRIPTION DERM
LOCATION SIMPLE: RIGHT CHEEK
LOCATION SIMPLE: CHEST
LOCATION SIMPLE: LEFT UPPER BACK
LOCATION SIMPLE: ABDOMEN
LOCATION SIMPLE: RIGHT UPPER BACK

## 2022-10-05 NOTE — PROCEDURE: MIPS QUALITY
Additional Notes: patient has not received flu shot, but plans to
Detail Level: Detailed
Quality 110: Preventive Care And Screening: Influenza Immunization: Influenza Immunization not Administered for Documented Reasons.

## 2022-10-18 RX ORDER — DEXTROAMPHETAMINE SACCHARATE, AMPHETAMINE ASPARTATE MONOHYDRATE, DEXTROAMPHETAMINE SULFATE AND AMPHETAMINE SULFATE 2.5; 2.5; 2.5; 2.5 MG/1; MG/1; MG/1; MG/1
10 CAPSULE, EXTENDED RELEASE ORAL EVERY MORNING
Qty: 30 CAPSULE | Refills: 0 | Status: SHIPPED | OUTPATIENT
Start: 2022-10-18 | End: 2022-11-21 | Stop reason: SDUPTHER

## 2022-10-18 NOTE — TELEPHONE ENCOUNTER
PDMP        Medicine Refill Request    Last Office: 7/25/2022   Last Consult Visit: Visit date not found  Last Telemedicine Visit: 1/11/2021 Adriana Pete DO    Next Appointment: Visit date not found      Current Outpatient Medications:     amphetamine-dextroamphetamine XR (ADDERALL XR) 10 mg 24 hr capsule, Take 1 capsule (10 mg total) by mouth every morning., Disp: 30 capsule, Rfl: 0    diclofenac (VOLTAREN) 75 mg EC tablet, Take 75 mg by mouth 2 (two) times a day., Disp: , Rfl:       BP Readings from Last 3 Encounters:   07/25/22 124/84   11/22/21 134/78   06/08/21 138/86       Recent Lab results:  Lab Results   Component Value Date    CHOL 241 (H) 02/24/2021   ,   Lab Results   Component Value Date    HDL 44 02/24/2021   ,   Lab Results   Component Value Date    LDLCALC 165 (H) 02/24/2021   ,   Lab Results   Component Value Date    TRIG 175 (H) 02/24/2021        Lab Results   Component Value Date    GLUCOSE 90 02/24/2021   , No results found for: HGBA1C      Lab Results   Component Value Date    CREATININE 1.01 02/24/2021       No results found for: TSH

## 2022-11-21 RX ORDER — DEXTROAMPHETAMINE SACCHARATE, AMPHETAMINE ASPARTATE MONOHYDRATE, DEXTROAMPHETAMINE SULFATE AND AMPHETAMINE SULFATE 2.5; 2.5; 2.5; 2.5 MG/1; MG/1; MG/1; MG/1
10 CAPSULE, EXTENDED RELEASE ORAL EVERY MORNING
Qty: 30 CAPSULE | Refills: 0 | Status: SHIPPED | OUTPATIENT
Start: 2022-11-21 | End: 2022-12-19 | Stop reason: SDUPTHER

## 2022-11-21 NOTE — TELEPHONE ENCOUNTER
Medicine Refill Request    10/18/2022 10/18/2022 Adderall Xr (Capsule, Extended Release) 30.0 30 10 MG NA NORMA PETE   09/08/2022 09/07/2022 Adderall Xr (Capsule, Extended Release) 30.0 30 10 MG NA NORMA PETE        Last Office: 7/25/2022   Last Consult Visit: Visit date not found  Last Telemedicine Visit: 1/11/2021 Norma Pete DO    Next Appointment: Visit date not found      Current Outpatient Medications:     amphetamine-dextroamphetamine XR (ADDERALL XR) 10 mg 24 hr capsule, Take 1 capsule (10 mg total) by mouth every morning., Disp: 30 capsule, Rfl: 0    diclofenac (VOLTAREN) 75 mg EC tablet, Take 75 mg by mouth 2 (two) times a day., Disp: , Rfl:       BP Readings from Last 3 Encounters:   07/25/22 124/84   11/22/21 134/78   06/08/21 138/86       Recent Lab results:  Lab Results   Component Value Date    CHOL 241 (H) 02/24/2021   ,   Lab Results   Component Value Date    HDL 44 02/24/2021   ,   Lab Results   Component Value Date    LDLCALC 165 (H) 02/24/2021   ,   Lab Results   Component Value Date    TRIG 175 (H) 02/24/2021        Lab Results   Component Value Date    GLUCOSE 90 02/24/2021   , No results found for: HGBA1C      Lab Results   Component Value Date    CREATININE 1.01 02/24/2021       No results found for: TSH

## 2022-12-20 RX ORDER — DEXTROAMPHETAMINE SACCHARATE, AMPHETAMINE ASPARTATE MONOHYDRATE, DEXTROAMPHETAMINE SULFATE AND AMPHETAMINE SULFATE 2.5; 2.5; 2.5; 2.5 MG/1; MG/1; MG/1; MG/1
10 CAPSULE, EXTENDED RELEASE ORAL EVERY MORNING
Qty: 30 CAPSULE | Refills: 0 | Status: SHIPPED | OUTPATIENT
Start: 2022-12-20 | End: 2023-01-23 | Stop reason: SDUPTHER

## 2022-12-20 NOTE — TELEPHONE ENCOUNTER
Medicine Refill Request    Days Supply Quantity Provider Pharmacy     ADDERALL XR 10 MG CAPSULE 11/21/2022 30 30 each Adriana Pete DO [facility]/pharmacy #[id number] - G...   ADDERALL XR 10 MG CAPSULE 10/18/2022 30 30 each Adriana Pete,  [facility]/pharmacy #[id number] - G...   ADDERALL XR 10 MG CAPSULE 09/08/2022 30 30 each Adriana Pete,  [facility]/pharmacy #[id number] - G...   ADDERALL XR 10 MG CAPSULE 07/25/2022 30 30 each Adriana Pete            Last Office: 7/25/2022   Last Consult Visit: Visit date not found  Last Telemedicine Visit: 1/11/2021 Adriana Pete DO    Next Appointment: Visit date not found      Current Outpatient Medications:   •  amphetamine-dextroamphetamine XR (ADDERALL XR) 10 mg 24 hr capsule, Take 1 capsule (10 mg total) by mouth every morning., Disp: 30 capsule, Rfl: 0  •  diclofenac (VOLTAREN) 75 mg EC tablet, Take 75 mg by mouth 2 (two) times a day., Disp: , Rfl:       BP Readings from Last 3 Encounters:   07/25/22 124/84   11/22/21 134/78   06/08/21 138/86       Recent Lab results:  Lab Results   Component Value Date    CHOL 241 (H) 02/24/2021   ,   Lab Results   Component Value Date    HDL 44 02/24/2021   ,   Lab Results   Component Value Date    LDLCALC 165 (H) 02/24/2021   ,   Lab Results   Component Value Date    TRIG 175 (H) 02/24/2021        Lab Results   Component Value Date    GLUCOSE 90 02/24/2021   , No results found for: HGBA1C      Lab Results   Component Value Date    CREATININE 1.01 02/24/2021       No results found for: TSH

## 2023-01-23 RX ORDER — DEXTROAMPHETAMINE SACCHARATE, AMPHETAMINE ASPARTATE MONOHYDRATE, DEXTROAMPHETAMINE SULFATE AND AMPHETAMINE SULFATE 2.5; 2.5; 2.5; 2.5 MG/1; MG/1; MG/1; MG/1
10 CAPSULE, EXTENDED RELEASE ORAL EVERY MORNING
Qty: 30 CAPSULE | Refills: 0 | Status: SHIPPED | OUTPATIENT
Start: 2023-01-23 | End: 2023-03-09 | Stop reason: SDUPTHER

## 2023-01-23 NOTE — TELEPHONE ENCOUNTER
Medicine Refill Request  Days Supply Quantity Provider Pharmacy     ADDERALL XR 10 MG CAPSULE 12/20/2022 30 30 each Adriana Pete DO [facility]/pharmacy #[id number] - G...   ADDERALL XR 10 MG CAPSULE 11/21/2022 30 30 each Adriana Pete DO [facility]/pharmacy #[id number] - G...   ADDERALL XR 10 MG CAPSULE 10/18/2022 30 30 each Adriana Pete DO [facility]/pharmacy #[id number] - G...   ADDERALL XR 10 MG CAPSULE 09/08/2022 30 30 each Adriana Pete DO              Last Office: 7/25/2022   Last Consult Visit: Visit date not found  Last Telemedicine Visit: 1/11/2021 Adriana Pete DO    Next Appointment: Visit date not found      Current Outpatient Medications:   •  amphetamine-dextroamphetamine XR (ADDERALL XR) 10 mg 24 hr capsule, Take 1 capsule (10 mg total) by mouth every morning., Disp: 30 capsule, Rfl: 0  •  diclofenac (VOLTAREN) 75 mg EC tablet, Take 75 mg by mouth 2 (two) times a day., Disp: , Rfl:       BP Readings from Last 3 Encounters:   07/25/22 124/84   11/22/21 134/78   06/08/21 138/86       Recent Lab results:  Lab Results   Component Value Date    CHOL 241 (H) 02/24/2021   ,   Lab Results   Component Value Date    HDL 44 02/24/2021   ,   Lab Results   Component Value Date    LDLCALC 165 (H) 02/24/2021   ,   Lab Results   Component Value Date    TRIG 175 (H) 02/24/2021        Lab Results   Component Value Date    GLUCOSE 90 02/24/2021   , No results found for: HGBA1C      Lab Results   Component Value Date    CREATININE 1.01 02/24/2021       No results found for: TSH

## 2023-02-06 ENCOUNTER — OFFICE VISIT (OUTPATIENT)
Dept: PRIMARY CARE | Facility: CLINIC | Age: 38
End: 2023-02-06
Payer: COMMERCIAL

## 2023-02-06 VITALS
RESPIRATION RATE: 16 BRPM | HEIGHT: 72 IN | TEMPERATURE: 96.6 F | WEIGHT: 241.4 LBS | OXYGEN SATURATION: 99 % | SYSTOLIC BLOOD PRESSURE: 138 MMHG | DIASTOLIC BLOOD PRESSURE: 72 MMHG | BODY MASS INDEX: 32.7 KG/M2 | HEART RATE: 63 BPM

## 2023-02-06 DIAGNOSIS — F90.2 ATTENTION DEFICIT HYPERACTIVITY DISORDER (ADHD), COMBINED TYPE: Primary | ICD-10-CM

## 2023-02-06 DIAGNOSIS — I73.00 RAYNAUD'S DISEASE WITHOUT GANGRENE: ICD-10-CM

## 2023-02-06 PROCEDURE — 99213 OFFICE O/P EST LOW 20 MIN: CPT | Performed by: FAMILY MEDICINE

## 2023-02-06 PROCEDURE — 3008F BODY MASS INDEX DOCD: CPT | Performed by: FAMILY MEDICINE

## 2023-02-06 ASSESSMENT — PATIENT HEALTH QUESTIONNAIRE - PHQ9: SUM OF ALL RESPONSES TO PHQ9 QUESTIONS 1 & 2: 0

## 2023-02-06 NOTE — PROGRESS NOTES
Adriana Pete D.O.  [facility] in [address], [address]  [place]  [address]  [phone]  Fax [phone]        History of Present Illness   Patient ID: Artur Soni is a 37 y.o. male.    Subjective  pt here for follow up of his aADHD med    HPI    Pt states doing well w ith meds--no side effets. No episodes of weight loss or cahnges in appetite  Akers complains of distal fingers/toes feeling colder than other parts of his body--no complaints of ROM difficulty ---       Past Medical/Surgical/Family/Social History     The following have been reviewed and updated as appropriate in this visit:          Past Medical History:   Diagnosis Date   • ADHD (attention deficit hyperactivity disorder)    • Gastroesophageal reflux disease without esophagitis 11/22/2021       Past Surgical History:   Procedure Laterality Date   • KNEE ARTHROSCOPY W/ MENISCAL REPAIR Right        Family History   Problem Relation Age of Onset   • No Known Problems Biological Mother    • Heart disease Biological Father    • Valvular heart disease Biological Father    • Early death Biological Father    • No Known Problems Biological Sister    • No Known Problems Maternal Grandmother    • Parkinsonism Maternal Grandfather    • Parkinsonism Paternal Grandmother    • Dementia Paternal Grandmother    • No Known Problems Paternal Grandfather    • Heart disease Mother's Sister    • No Known Problems Biological Son    • No Known Problems Biological Daughter        Social History     Socioeconomic History   • Marital status: Single     Spouse name: Not on file   • Number of children: 2   • Years of education: Not on file   • Highest education level: Not on file   Occupational History   • Occupation:    Tobacco Use   • Smoking status: Never   • Smokeless tobacco: Never   Vaping Use   • Vaping Use: Never used   Substance and Sexual Activity   • Alcohol use: Yes     Alcohol/week: 5.0 standard  drinks     Types: 5 Standard drinks or equivalent per week     Comment: Socially, about 5 drinks per week   • Drug use: Never   • Sexual activity: Yes     Partners: Female     Birth control/protection: Condom   Other Topics Concern   • Not on file   Social History Narrative   • Not on file     Social Determinants of Health     Financial Resource Strain: Not on file   Food Insecurity: Not on file   Transportation Needs: Not on file   Physical Activity: Not on file   Stress: Not on file   Social Connections: Not on file   Intimate Partner Violence: Not on file   Housing Stability: Not on file      Allergies and Medications     No Known Allergies      Current Outpatient Medications:   •  amphetamine-dextroamphetamine XR (ADDERALL XR) 10 mg 24 hr capsule, Take 1 capsule (10 mg total) by mouth every morning., Disp: 30 capsule, Rfl: 0   Review of Systems       Review of Systems   Constitutional: Negative for activity change, appetite change, chills, fatigue and fever.   HENT: Negative for congestion, ear pain, mouth sores, postnasal drip, sinus pain, sore throat, trouble swallowing and voice change.    Eyes: Negative for pain, discharge, redness, itching and visual disturbance.   Respiratory: Negative for cough, chest tightness, shortness of breath and wheezing.    Cardiovascular: Negative for chest pain and palpitations.      Physical Examination       Objective     Vitals:    02/06/23 1752   BP: 138/72   Pulse: 63   Resp: 16   Temp: (!) 35.9 °C (96.6 °F)   SpO2: 99%       Vitals:    02/06/23 1752   BP: 138/72   BP Location: Left upper arm   Patient Position: Sitting   Pulse: 63   Resp: 16   Temp: (!) 35.9 °C (96.6 °F)   TempSrc: Temporal   SpO2: 99%   Weight: 109 kg (241 lb 6.4 oz)   Height: 1.829 m (6')       Wt Readings from Last 3 Encounters:   02/06/23 109 kg (241 lb 6.4 oz)   07/25/22 106 kg (233 lb 3.2 oz)   11/22/21 110 kg (243 lb)       Ht Readings from Last 3 Encounters:   02/06/23 1.829 m (6')   07/25/22 1.829  "m (6')   11/22/21 1.816 m (5' 11.5\")       BP Readings from Last 3 Encounters:   02/06/23 138/72   07/25/22 124/84   11/22/21 134/78       Physical Exam  Vitals and nursing note reviewed.   Constitutional:       Appearance: He is well-developed.   HENT:      Head: Normocephalic and atraumatic.   Eyes:      Conjunctiva/sclera: Conjunctivae normal.      Pupils: Pupils are equal, round, and reactive to light.   Cardiovascular:      Rate and Rhythm: Normal rate and regular rhythm.      Heart sounds: Normal heart sounds.   Pulmonary:      Effort: Pulmonary effort is normal.      Breath sounds: Normal breath sounds.   Musculoskeletal:      Cervical back: Normal range of motion and neck supple.   Skin:     General: Skin is warm and dry.      Comments: Change in color at distal fingers/toes        Laboratory Results     Lab Results   Component Value Date    WBC 7.9 02/24/2021    HGB 15.4 02/24/2021    HCT 46.1 02/24/2021    MCV 85 02/24/2021     02/24/2021          Chemistry        Component Value Date/Time     02/24/2021 0930    K 4.5 02/24/2021 0930    CL 99 02/24/2021 0930    CO2 25 02/24/2021 0930    BUN 17 02/24/2021 0930    CREATININE 1.01 02/24/2021 0930        Component Value Date/Time    ALKPHOS 93 02/24/2021 0930    AST 30 02/24/2021 0930    ALT 50 (H) 02/24/2021 0930    BILITOT 0.5 02/24/2021 0930            Lab Results   Component Value Date    GLUCOSE 90 02/24/2021     02/24/2021    K 4.5 02/24/2021    CO2 25 02/24/2021    CL 99 02/24/2021    BUN 17 02/24/2021    CREATININE 1.01 02/24/2021       Lab Results   Component Value Date    CHOL 241 (H) 02/24/2021     Lab Results   Component Value Date    HDL 44 02/24/2021     Lab Results   Component Value Date    LDLCALC 165 (H) 02/24/2021     Lab Results   Component Value Date    TRIG 175 (H) 02/24/2021     No results found for: CHOLHDL    No results found for: TSH    No results found for: HGBA1C    No results found for: PSA    No results found " for: HEPCAB      Immunization History   Administered Date(s) Administered   • Influenza TIV (IM) 11/11/2011   • Influenza Vaccine Quadrivalent Preservative Free 6 Mons and Up 11/30/2020, 11/22/2021   • Influenza Vaccine Quadrivalent Preservative Free 6-35 Months 10/14/2017, 08/14/2018   • Influenza, Unspecified 11/02/2015, 11/30/2020   • SARS-COV-2 (COVID19) VACCINE, PFIZER 01/29/2021, 02/20/2021   • Tdap 01/01/2013, 08/14/2018, 11/22/2021          Assessment and Plan       Assessment/Plan     Problem List Items Addressed This Visit        Circulatory    Raynaud's syndrome    Current Assessment & Plan     Discussed with pt   No treatment except to keep his distal extremities warm--aware if worsens to notify         Relevant Orders    Ultrasound arterial PVR with digits lower       Mental Health    ADHD (attention deficit hyperactivity disorder) - Primary    Current Assessment & Plan     --chronic and stable on meds  No c hange in dosage            No follow-ups on file.    No orders of the defined types were placed in this encounter.         Adriana Pete DO  2/25/2023

## 2023-02-25 ASSESSMENT — ENCOUNTER SYMPTOMS
SINUS PAIN: 0
WHEEZING: 0
EYE ITCHING: 0
FEVER: 0
TROUBLE SWALLOWING: 0
SORE THROAT: 0
EYE DISCHARGE: 0
CHEST TIGHTNESS: 0
SHORTNESS OF BREATH: 0
VOICE CHANGE: 0
COUGH: 0
FATIGUE: 0
PALPITATIONS: 0
EYE REDNESS: 0
APPETITE CHANGE: 0
ACTIVITY CHANGE: 0
CHILLS: 0
EYE PAIN: 0

## 2023-02-26 NOTE — ASSESSMENT & PLAN NOTE
Discussed with pt   No treatment except to keep his distal extremities warm--aware if worsens to notify

## 2023-03-09 RX ORDER — DEXTROAMPHETAMINE SACCHARATE, AMPHETAMINE ASPARTATE MONOHYDRATE, DEXTROAMPHETAMINE SULFATE AND AMPHETAMINE SULFATE 2.5; 2.5; 2.5; 2.5 MG/1; MG/1; MG/1; MG/1
10 CAPSULE, EXTENDED RELEASE ORAL EVERY MORNING
Qty: 30 CAPSULE | Refills: 0 | Status: SHIPPED | OUTPATIENT
Start: 2023-03-09 | End: 2023-04-17 | Stop reason: SDUPTHER

## 2023-03-09 NOTE — TELEPHONE ENCOUNTER
PDMP    Medicine Refill Request    Last Office: 2/6/2023   Last Consult Visit: Visit date not found  Last Telemedicine Visit: 1/11/2021 Adriana Pete DO    Next Appointment: Visit date not found      Current Outpatient Medications:   •  amphetamine-dextroamphetamine XR (ADDERALL XR) 10 mg 24 hr capsule, Take 1 capsule (10 mg total) by mouth every morning., Disp: 30 capsule, Rfl: 0      BP Readings from Last 3 Encounters:   02/06/23 138/72   07/25/22 124/84   11/22/21 134/78       Recent Lab results:  Lab Results   Component Value Date    CHOL 241 (H) 02/24/2021   ,   Lab Results   Component Value Date    HDL 44 02/24/2021   ,   Lab Results   Component Value Date    LDLCALC 165 (H) 02/24/2021   ,   Lab Results   Component Value Date    TRIG 175 (H) 02/24/2021        Lab Results   Component Value Date    GLUCOSE 90 02/24/2021   , No results found for: HGBA1C      Lab Results   Component Value Date    CREATININE 1.01 02/24/2021       No results found for: TSH

## 2023-04-18 RX ORDER — DEXTROAMPHETAMINE SACCHARATE, AMPHETAMINE ASPARTATE MONOHYDRATE, DEXTROAMPHETAMINE SULFATE AND AMPHETAMINE SULFATE 2.5; 2.5; 2.5; 2.5 MG/1; MG/1; MG/1; MG/1
10 CAPSULE, EXTENDED RELEASE ORAL EVERY MORNING
Qty: 30 CAPSULE | Refills: 0 | Status: SHIPPED | OUTPATIENT
Start: 2023-04-18 | End: 2023-05-25 | Stop reason: SDUPTHER

## 2023-05-25 RX ORDER — DEXTROAMPHETAMINE SACCHARATE, AMPHETAMINE ASPARTATE MONOHYDRATE, DEXTROAMPHETAMINE SULFATE AND AMPHETAMINE SULFATE 2.5; 2.5; 2.5; 2.5 MG/1; MG/1; MG/1; MG/1
10 CAPSULE, EXTENDED RELEASE ORAL EVERY MORNING
Qty: 30 CAPSULE | Refills: 0 | Status: SHIPPED | OUTPATIENT
Start: 2023-05-25 | End: 2023-07-12 | Stop reason: SDUPTHER

## 2023-05-25 NOTE — TELEPHONE ENCOUNTER
Medicine Refill Request   Days Supply Quantity Provider Pharmacy    ADDERALL XR 10 MG CAPSULE 04/18/2023 30 30 each Adriana Pete DO [facility]/pharmacy #[id number] - G...   ADDERALL XR 10 MG CAPSULE 03/09/2023 30 30 each Adriana Pete DO [facility]/pharmacy #[id number] - G...   ADDERALL XR 10 MG CAPSULE 01/23/2023 30 30 each Adriana Pete DO [facility]/pharmacy #[id number] - G...   ADDERALL XR 10 MG CAPSULE 12/20/2022 30 30 each Adriana Pete DO [facility]/pharmacy #[id number] - G...   ADDERALL XR 10 MG CAPSULE 11/21/2022 30 30 each Adriana Pete DO [facility]/pharmacy #[id number] - G           Last Office: 2/6/2023   Last Consult Visit: Visit date not found  Last Telemedicine Visit: 1/11/2021 Adriana Pete DO    Next Appointment: Visit date not found      Current Outpatient Medications:   •  amphetamine-dextroamphetamine XR (ADDERALL XR) 10 mg 24 hr capsule, Take 1 capsule (10 mg total) by mouth every morning., Disp: 30 capsule, Rfl: 0      BP Readings from Last 3 Encounters:   02/06/23 138/72   07/25/22 124/84   11/22/21 134/78       Recent Lab results:  Lab Results   Component Value Date    CHOL 241 (H) 02/24/2021   ,   Lab Results   Component Value Date    HDL 44 02/24/2021   ,   Lab Results   Component Value Date    LDLCALC 165 (H) 02/24/2021   ,   Lab Results   Component Value Date    TRIG 175 (H) 02/24/2021        Lab Results   Component Value Date    GLUCOSE 90 02/24/2021   , No results found for: HGBA1C      Lab Results   Component Value Date    CREATININE 1.01 02/24/2021       No results found for: TSH

## 2023-07-13 RX ORDER — DEXTROAMPHETAMINE SACCHARATE, AMPHETAMINE ASPARTATE MONOHYDRATE, DEXTROAMPHETAMINE SULFATE AND AMPHETAMINE SULFATE 2.5; 2.5; 2.5; 2.5 MG/1; MG/1; MG/1; MG/1
10 CAPSULE, EXTENDED RELEASE ORAL EVERY MORNING
Qty: 30 CAPSULE | Refills: 0 | Status: SHIPPED | OUTPATIENT
Start: 2023-07-13 | End: 2023-09-04 | Stop reason: SDUPTHER

## 2023-07-13 NOTE — TELEPHONE ENCOUNTER
Medicine Refill Request      05/25/2023 05/25/2023 Adderall Xr (Capsule, Extended Release) 30.0 30 2.5 MG / 2.5 MG / 2.5 MG / 2.5 MG LETTY PETE [facility] Commercial Insurance 0 / 0 [place]    1 [id number] 04/18/2023 04/18/2023 Adderall Xr (Capsule, Extended Release) 30.0 30 2.5 MG / 2.5 MG / 2.5 MG / 2.5 MG LETTY PETE        Last Office: 2/6/2023   Last Consult Visit: Visit date not found  Last Telemedicine Visit: 1/11/2021 Adriana Pete DO    Next Appointment: Visit date not found      Current Outpatient Medications:   •  amphetamine-dextroamphetamine XR (ADDERALL XR) 10 mg 24 hr capsule, Take 1 capsule (10 mg total) by mouth every morning., Disp: 30 capsule, Rfl: 0      BP Readings from Last 3 Encounters:   02/06/23 138/72   07/25/22 124/84   11/22/21 134/78       Recent Lab results:  Lab Results   Component Value Date    CHOL 241 (H) 02/24/2021   ,   Lab Results   Component Value Date    HDL 44 02/24/2021   ,   Lab Results   Component Value Date    LDLCALC 165 (H) 02/24/2021   ,   Lab Results   Component Value Date    TRIG 175 (H) 02/24/2021        Lab Results   Component Value Date    GLUCOSE 90 02/24/2021   , No results found for: HGBA1C      Lab Results   Component Value Date    CREATININE 1.01 02/24/2021       No results found for: TSH

## 2023-09-06 RX ORDER — DEXTROAMPHETAMINE SACCHARATE, AMPHETAMINE ASPARTATE MONOHYDRATE, DEXTROAMPHETAMINE SULFATE AND AMPHETAMINE SULFATE 2.5; 2.5; 2.5; 2.5 MG/1; MG/1; MG/1; MG/1
10 CAPSULE, EXTENDED RELEASE ORAL EVERY MORNING
Qty: 30 CAPSULE | Refills: 0 | Status: SHIPPED | OUTPATIENT
Start: 2023-09-06 | End: 2023-10-17 | Stop reason: SDUPTHER

## 2023-09-06 NOTE — TELEPHONE ENCOUNTER
Medicine Refill Request    Last Office: 2/6/2023   Last Consult Visit: Visit date not found  Last Telemedicine Visit: 1/11/2021 Adriana Pete DO    Next Appointment: Visit date not found      Current Outpatient Medications:     amphetamine-dextroamphetamine XR (ADDERALL XR) 10 mg 24 hr capsule, Take 1 capsule (10 mg total) by mouth every morning., Disp: 30 capsule, Rfl: 0      BP Readings from Last 3 Encounters:   02/06/23 138/72   07/25/22 124/84   11/22/21 134/78       Recent Lab results:  Lab Results   Component Value Date    CHOL 241 (H) 02/24/2021   ,   Lab Results   Component Value Date    HDL 44 02/24/2021   ,   Lab Results   Component Value Date    LDLCALC 165 (H) 02/24/2021   ,   Lab Results   Component Value Date    TRIG 175 (H) 02/24/2021        Lab Results   Component Value Date    GLUCOSE 90 02/24/2021   , No results found for: HGBA1C      Lab Results   Component Value Date    CREATININE 1.01 02/24/2021       No results found for: TSH

## 2023-10-17 NOTE — TELEPHONE ENCOUNTER
"Medicine Refill Request    Last Office: 2/6/2023   Last Consult Visit: Visit date not found  Last Telemedicine Visit: 1/11/2021 Adriana Pete DO    Next Appointment: Visit date not found      Current Outpatient Medications:     amphetamine-dextroamphetamine XR (ADDERALL XR) 10 mg 24 hr capsule, Take 1 capsule (10 mg total) by mouth every morning., Disp: 30 capsule, Rfl: 0      BP Readings from Last 3 Encounters:   02/06/23 138/72   07/25/22 124/84   11/22/21 134/78       Recent Lab results:  Lab Results   Component Value Date    CHOL 241 (H) 02/24/2021   ,   Lab Results   Component Value Date    HDL 44 02/24/2021   ,   Lab Results   Component Value Date    LDLCALC 165 (H) 02/24/2021   ,   Lab Results   Component Value Date    TRIG 175 (H) 02/24/2021        Lab Results   Component Value Date    GLUCOSE 90 02/24/2021   , No results found for: \"HGBA1C\"      Lab Results   Component Value Date    CREATININE 1.01 02/24/2021       No results found for: \"TSH\"      "

## 2023-10-18 RX ORDER — DEXTROAMPHETAMINE SACCHARATE, AMPHETAMINE ASPARTATE MONOHYDRATE, DEXTROAMPHETAMINE SULFATE AND AMPHETAMINE SULFATE 2.5; 2.5; 2.5; 2.5 MG/1; MG/1; MG/1; MG/1
10 CAPSULE, EXTENDED RELEASE ORAL EVERY MORNING
Qty: 30 CAPSULE | Refills: 0 | Status: SHIPPED | OUTPATIENT
Start: 2023-10-18 | End: 2023-11-29 | Stop reason: SDUPTHER

## 2023-11-30 RX ORDER — DEXTROAMPHETAMINE SACCHARATE, AMPHETAMINE ASPARTATE MONOHYDRATE, DEXTROAMPHETAMINE SULFATE AND AMPHETAMINE SULFATE 2.5; 2.5; 2.5; 2.5 MG/1; MG/1; MG/1; MG/1
10 CAPSULE, EXTENDED RELEASE ORAL EVERY MORNING
Qty: 30 CAPSULE | Refills: 0 | Status: SHIPPED | OUTPATIENT
Start: 2023-11-30 | End: 2023-12-19 | Stop reason: SDUPTHER

## 2023-12-20 RX ORDER — DEXTROAMPHETAMINE SACCHARATE, AMPHETAMINE ASPARTATE MONOHYDRATE, DEXTROAMPHETAMINE SULFATE AND AMPHETAMINE SULFATE 2.5; 2.5; 2.5; 2.5 MG/1; MG/1; MG/1; MG/1
10 CAPSULE, EXTENDED RELEASE ORAL EVERY MORNING
Qty: 30 CAPSULE | Refills: 0 | Status: SHIPPED | OUTPATIENT
Start: 2023-12-20 | End: 2024-02-07 | Stop reason: SDUPTHER

## 2023-12-20 NOTE — TELEPHONE ENCOUNTER
"Medicine Refill Request    Last Office: 2/6/2023   Last Consult Visit: Visit date not found  Last Telemedicine Visit: 1/11/2021 Adriana Pete DO    Next Appointment: Visit date not found      Current Outpatient Medications:   •  amphetamine-dextroamphetamine XR (ADDERALL XR) 10 mg 24 hr capsule, Take 1 capsule (10 mg total) by mouth every morning., Disp: 30 capsule, Rfl: 0      BP Readings from Last 3 Encounters:   02/06/23 138/72   07/25/22 124/84   11/22/21 134/78       Recent Lab results:  Lab Results   Component Value Date    CHOL 241 (H) 02/24/2021   ,   Lab Results   Component Value Date    HDL 44 02/24/2021   ,   Lab Results   Component Value Date    LDLCALC 165 (H) 02/24/2021   ,   Lab Results   Component Value Date    TRIG 175 (H) 02/24/2021        Lab Results   Component Value Date    GLUCOSE 90 02/24/2021   , No results found for: \"HGBA1C\"      Lab Results   Component Value Date    CREATININE 1.01 02/24/2021       No results found for: \"TSH\"      "

## 2024-02-07 RX ORDER — DEXTROAMPHETAMINE SACCHARATE, AMPHETAMINE ASPARTATE MONOHYDRATE, DEXTROAMPHETAMINE SULFATE AND AMPHETAMINE SULFATE 2.5; 2.5; 2.5; 2.5 MG/1; MG/1; MG/1; MG/1
10 CAPSULE, EXTENDED RELEASE ORAL EVERY MORNING
Qty: 30 CAPSULE | Refills: 0 | Status: SHIPPED | OUTPATIENT
Start: 2024-02-07 | End: 2024-04-08 | Stop reason: SDUPTHER

## 2024-02-07 NOTE — TELEPHONE ENCOUNTER
"Medicine Refill Request    Last Office Visit: 2/6/2023   Last Consult Visit: Visit date not found  Last Telemedicine Visit: 1/11/2021 Adriana Pete DO    Next Appointment: 3/6/2024      Current Outpatient Medications:   •  amphetamine-dextroamphetamine XR (ADDERALL XR) 10 mg 24 hr capsule, Take 1 capsule (10 mg total) by mouth every morning., Disp: 30 capsule, Rfl: 0      BP Readings from Last 3 Encounters:   02/06/23 138/72   07/25/22 124/84   11/22/21 134/78       Recent Lab results:  Lab Results   Component Value Date    CHOL 241 (H) 02/24/2021   ,   Lab Results   Component Value Date    HDL 44 02/24/2021   ,   Lab Results   Component Value Date    LDLCALC 165 (H) 02/24/2021   ,   Lab Results   Component Value Date    TRIG 175 (H) 02/24/2021        Lab Results   Component Value Date    GLUCOSE 90 02/24/2021   , No results found for: \"HGBA1C\"      Lab Results   Component Value Date    CREATININE 1.01 02/24/2021       No results found for: \"TSH\"      No results found for: \"HGBA1C\"  "

## 2024-04-08 RX ORDER — DEXTROAMPHETAMINE SACCHARATE, AMPHETAMINE ASPARTATE MONOHYDRATE, DEXTROAMPHETAMINE SULFATE AND AMPHETAMINE SULFATE 2.5; 2.5; 2.5; 2.5 MG/1; MG/1; MG/1; MG/1
10 CAPSULE, EXTENDED RELEASE ORAL EVERY MORNING
Qty: 30 CAPSULE | Refills: 0 | Status: SHIPPED | OUTPATIENT
Start: 2024-04-08 | End: 2024-05-08 | Stop reason: SDUPTHER

## 2024-04-08 NOTE — TELEPHONE ENCOUNTER
"Requesting a refill of adderall xr        Medicine Refill Request    Last Office Visit: 2/6/2023   Last Consult Visit: Visit date not found  Last Telemedicine Visit: 1/11/2021 Adriana Pete DO    Next Appointment: 4/10/2024      Current Outpatient Medications:     amphetamine-dextroamphetamine XR (ADDERALL XR) 10 mg 24 hr capsule, Take 1 capsule (10 mg total) by mouth every morning., Disp: 30 capsule, Rfl: 0      BP Readings from Last 3 Encounters:   02/06/23 138/72   07/25/22 124/84   11/22/21 134/78       Recent Lab results:  Lab Results   Component Value Date    CHOL 241 (H) 02/24/2021   ,   Lab Results   Component Value Date    HDL 44 02/24/2021   ,   Lab Results   Component Value Date    LDLCALC 165 (H) 02/24/2021   ,   Lab Results   Component Value Date    TRIG 175 (H) 02/24/2021        Lab Results   Component Value Date    GLUCOSE 90 02/24/2021   , No results found for: \"HGBA1C\"      Lab Results   Component Value Date    CREATININE 1.01 02/24/2021       No results found for: \"TSH\"      No results found for: \"HGBA1C\"   "

## 2024-04-10 ENCOUNTER — APPOINTMENT (OUTPATIENT)
Dept: LAB | Age: 39
End: 2024-04-10
Attending: FAMILY MEDICINE
Payer: COMMERCIAL

## 2024-04-10 ENCOUNTER — OFFICE VISIT (OUTPATIENT)
Dept: PRIMARY CARE | Facility: CLINIC | Age: 39
End: 2024-04-10
Payer: COMMERCIAL

## 2024-04-10 VITALS
HEIGHT: 72 IN | BODY MASS INDEX: 32.48 KG/M2 | SYSTOLIC BLOOD PRESSURE: 132 MMHG | WEIGHT: 239.8 LBS | TEMPERATURE: 97.2 F | OXYGEN SATURATION: 97 % | DIASTOLIC BLOOD PRESSURE: 84 MMHG | RESPIRATION RATE: 16 BRPM | HEART RATE: 65 BPM

## 2024-04-10 DIAGNOSIS — Z00.00 ENCOUNTER FOR GENERAL ADULT MEDICAL EXAMINATION WITHOUT ABNORMAL FINDINGS: Primary | ICD-10-CM

## 2024-04-10 DIAGNOSIS — F90.2 ATTENTION DEFICIT HYPERACTIVITY DISORDER (ADHD), COMBINED TYPE: ICD-10-CM

## 2024-04-10 DIAGNOSIS — Z00.00 ENCOUNTER FOR GENERAL ADULT MEDICAL EXAMINATION WITHOUT ABNORMAL FINDINGS: ICD-10-CM

## 2024-04-10 DIAGNOSIS — Z82.49 FAMILY HISTORY OF EARLY CAD: ICD-10-CM

## 2024-04-10 LAB
ALBUMIN SERPL-MCNC: 4.6 G/DL (ref 3.5–5.7)
ALP SERPL-CCNC: 70 IU/L (ref 34–125)
ALT SERPL-CCNC: 25 IU/L (ref 7–52)
ANION GAP SERPL CALC-SCNC: 7 MEQ/L (ref 3–15)
AST SERPL-CCNC: 19 IU/L (ref 13–39)
BACTERIA URNS QL MICRO: NORMAL /HPF
BILIRUB SERPL-MCNC: 0.6 MG/DL (ref 0.3–1.2)
BILIRUB UR QL STRIP.AUTO: NEGATIVE MG/DL
BUN SERPL-MCNC: 18 MG/DL (ref 7–25)
CALCIUM SERPL-MCNC: 9.5 MG/DL (ref 8.6–10.3)
CHLORIDE SERPL-SCNC: 103 MEQ/L (ref 98–107)
CHOLEST SERPL-MCNC: 223 MG/DL
CLARITY UR REFRACT.AUTO: CLEAR
CO2 SERPL-SCNC: 28 MEQ/L (ref 21–31)
COLOR UR AUTO: YELLOW
CREAT SERPL-MCNC: 1 MG/DL (ref 0.7–1.3)
EGFRCR SERPLBLD CKD-EPI 2021: >60 ML/MIN/1.73M*2
ERYTHROCYTE [DISTWIDTH] IN BLOOD BY AUTOMATED COUNT: 12.5 % (ref 11.6–14.4)
GLUCOSE SERPL-MCNC: 93 MG/DL (ref 70–99)
GLUCOSE UR STRIP.AUTO-MCNC: NEGATIVE MG/DL
HCT VFR BLD AUTO: 45.2 % (ref 40.1–51)
HDLC SERPL-MCNC: 48 MG/DL
HDLC SERPL: 4.6 {RATIO}
HGB BLD-MCNC: 14.9 G/DL (ref 13.7–17.5)
HGB UR QL STRIP.AUTO: NEGATIVE
HYALINE CASTS #/AREA URNS LPF: NORMAL /LPF
KETONES UR STRIP.AUTO-MCNC: NEGATIVE MG/DL
LDLC SERPL CALC-MCNC: 149 MG/DL
LEUKOCYTE ESTERASE UR QL STRIP.AUTO: NEGATIVE
MCH RBC QN AUTO: 28.7 PG (ref 28–33.2)
MCHC RBC AUTO-ENTMCNC: 33 G/DL (ref 32.2–36.5)
MCV RBC AUTO: 86.9 FL (ref 83–98)
NITRITE UR QL STRIP.AUTO: NEGATIVE
NONHDLC SERPL-MCNC: 175 MG/DL
PDW BLD AUTO: 11.2 FL (ref 9.4–12.4)
PH UR STRIP.AUTO: 6.5 [PH]
PLATELET # BLD AUTO: 243 K/UL (ref 150–350)
POTASSIUM SERPL-SCNC: 4.5 MEQ/L (ref 3.5–5.1)
PROT SERPL-MCNC: 7.1 G/DL (ref 6–8.2)
PROT UR QL STRIP.AUTO: ABNORMAL
RBC # BLD AUTO: 5.2 M/UL (ref 4.5–5.8)
RBC #/AREA URNS HPF: NORMAL /HPF
SODIUM SERPL-SCNC: 138 MEQ/L (ref 136–145)
SP GR UR REFRACT.AUTO: 1.03
SQUAMOUS URNS QL MICRO: NORMAL /HPF
TRIGL SERPL-MCNC: 129 MG/DL
UROBILINOGEN UR STRIP-ACNC: 0.2 EU/DL
WBC # BLD AUTO: 7.35 K/UL (ref 3.8–10.5)
WBC #/AREA URNS HPF: NORMAL /HPF

## 2024-04-10 PROCEDURE — 99395 PREV VISIT EST AGE 18-39: CPT | Performed by: FAMILY MEDICINE

## 2024-04-10 PROCEDURE — 81001 URINALYSIS AUTO W/SCOPE: CPT

## 2024-04-10 PROCEDURE — 85027 COMPLETE CBC AUTOMATED: CPT

## 2024-04-10 PROCEDURE — 80053 COMPREHEN METABOLIC PANEL: CPT

## 2024-04-10 PROCEDURE — 3008F BODY MASS INDEX DOCD: CPT | Performed by: FAMILY MEDICINE

## 2024-04-10 PROCEDURE — 36415 COLL VENOUS BLD VENIPUNCTURE: CPT

## 2024-04-10 PROCEDURE — 80061 LIPID PANEL: CPT

## 2024-04-10 ASSESSMENT — PATIENT HEALTH QUESTIONNAIRE - PHQ9: SUM OF ALL RESPONSES TO PHQ9 QUESTIONS 1 & 2: 0

## 2024-04-10 NOTE — PROGRESS NOTES
Adriana Pete D.O.   [facility] in [address], [address]  [place] [address]  [phone]  Fax [phone]          History of Present Illness   Patient ID: Artur Soni is a 38 y.o. male.    Subjective Annual Exam    HPI  38 y.o. male presents for an annual exam with no major complaints.    Pt reports starting intermittent fasting every day between 1 and 9 PM, and has lost 15 lbs since December 2023.  Due for updated labs  Pt reports fhx - his father with CAD and HF.       Past Medical/Surgical/Family/Social History     The following have been reviewed and updated as appropriate in this visit:   Allergies  Meds  Problems         Past Medical History:   Diagnosis Date   • ADHD (attention deficit hyperactivity disorder)    • Gastroesophageal reflux disease without esophagitis 11/22/2021       Past Surgical History:   Procedure Laterality Date   • KNEE ARTHROSCOPY W/ MENISCAL REPAIR Right        Family History   Problem Relation Age of Onset   • No Known Problems Biological Mother    • Heart disease Biological Father    • Valvular heart disease Biological Father    • Early death Biological Father    • No Known Problems Biological Sister    • No Known Problems Maternal Grandmother    • Parkinsonism Maternal Grandfather    • Parkinsonism Paternal Grandmother    • Dementia Paternal Grandmother    • No Known Problems Paternal Grandfather    • Heart disease Mother's Sister    • No Known Problems Biological Son    • No Known Problems Biological Daughter        Social History     Socioeconomic History   • Marital status: Single     Spouse name: Not on file   • Number of children: 2   • Years of education: Not on file   • Highest education level: Not on file   Occupational History   • Occupation:    Tobacco Use   • Smoking status: Never   • Smokeless tobacco: Never   Vaping Use   • Vaping Use: Never used   Substance and Sexual Activity   • Alcohol use: Yes      Alcohol/week: 5.0 standard drinks of alcohol     Types: 5 Standard drinks or equivalent per week     Comment: Socially, about 5 drinks per week   • Drug use: Never   • Sexual activity: Yes     Partners: Female     Birth control/protection: Condom   Other Topics Concern   • Not on file   Social History Narrative   • Not on file     Social Determinants of Health     Financial Resource Strain: Not on file   Food Insecurity: Not on file   Transportation Needs: Not on file   Physical Activity: Not on file   Stress: Not on file   Social Connections: Not on file   Intimate Partner Violence: Not on file   Housing Stability: Not on file      Allergies and Medications     No Known Allergies      Current Outpatient Medications:   •  amphetamine-dextroamphetamine XR (ADDERALL XR) 10 mg 24 hr capsule, Take 1 capsule (10 mg total) by mouth every morning., Disp: 30 capsule, Rfl: 0   Review of Systems       Review of Systems   Constitutional: Negative for activity change, appetite change, fatigue, fever and unexpected weight change.   HENT: Negative for congestion, ear discharge, ear pain, hearing loss, postnasal drip, sinus pressure, sinus pain, sore throat and voice change.    Eyes: Negative for photophobia, pain, discharge, redness, itching and visual disturbance.   Respiratory: Negative for cough, chest tightness, shortness of breath and wheezing.    Cardiovascular: Negative for chest pain and palpitations.   Gastrointestinal: Negative for abdominal distention, abdominal pain, blood in stool, constipation, diarrhea and nausea.   Endocrine: Negative for cold intolerance, heat intolerance and polyuria.   Genitourinary: Negative for difficulty urinating, dysuria, frequency, genital sores, penile discharge, penile pain, penile swelling, scrotal swelling and testicular pain.   Musculoskeletal: Negative for arthralgias, back pain, joint swelling and myalgias.   Skin: Negative for color change and rash.   Allergic/Immunologic:  Negative for environmental allergies and food allergies.   Neurological: Negative for dizziness, weakness, light-headedness, numbness and headaches.   Hematological: Does not bruise/bleed easily.   Psychiatric/Behavioral: Negative for agitation, behavioral problems, dysphoric mood, sleep disturbance and suicidal ideas. The patient is not nervous/anxious and is not hyperactive.       Physical Examination       Objective     Vitals:    04/10/24 0850   BP: 132/84   Pulse:    Resp:    Temp:    SpO2:        Vitals:    04/10/24 0843 04/10/24 0850   BP: 124/80 132/84   BP Location: Right upper arm Right upper arm   Patient Position: Sitting Sitting   Pulse: 65    Resp: 16    Temp: 36.2 °C (97.2 °F)    TempSrc: Temporal    SpO2: 97%    Weight: 109 kg (239 lb 12.8 oz)    Height: 1.829 m (6')        Wt Readings from Last 3 Encounters:   04/10/24 109 kg (239 lb 12.8 oz)   02/06/23 109 kg (241 lb 6.4 oz)   07/25/22 106 kg (233 lb 3.2 oz)       Ht Readings from Last 3 Encounters:   04/10/24 1.829 m (6')   02/06/23 1.829 m (6')   07/25/22 1.829 m (6')       BP Readings from Last 3 Encounters:   04/10/24 132/84   02/06/23 138/72   07/25/22 124/84       Physical Exam  Vitals and nursing note reviewed.   Constitutional:       Appearance: Normal appearance.   HENT:      Head: Normocephalic.      Right Ear: Tympanic membrane, ear canal and external ear normal.      Left Ear: Tympanic membrane, ear canal and external ear normal.      Nose: Nose normal.      Mouth/Throat:      Mouth: Mucous membranes are moist.      Pharynx: Oropharynx is clear.   Eyes:      Extraocular Movements: Extraocular movements intact.      Conjunctiva/sclera: Conjunctivae normal.      Pupils: Pupils are equal, round, and reactive to light.   Cardiovascular:      Rate and Rhythm: Normal rate and regular rhythm.      Pulses: Normal pulses.      Heart sounds: Normal heart sounds. No murmur heard.  Pulmonary:      Effort: Pulmonary effort is normal.      Breath  "sounds: No wheezing, rhonchi or rales.   Abdominal:      General: Abdomen is flat. Bowel sounds are normal. There is no distension.      Palpations: Abdomen is soft. There is no mass.      Tenderness: There is no abdominal tenderness. There is no guarding.      Hernia: No hernia is present.   Musculoskeletal:         General: Normal range of motion.      Cervical back: Normal range of motion and neck supple.   Skin:     General: Skin is warm and dry.      Capillary Refill: Capillary refill takes less than 2 seconds.   Neurological:      General: No focal deficit present.      Mental Status: He is alert and oriented to person, place, and time.   Psychiatric:         Mood and Affect: Mood normal.         Behavior: Behavior normal.        Laboratory Results     Lab Results   Component Value Date    WBC 7.35 04/10/2024    HGB 14.9 04/10/2024    HCT 45.2 04/10/2024    MCV 86.9 04/10/2024     04/10/2024          Chemistry        Component Value Date/Time     04/10/2024 0929    K 4.5 04/10/2024 0929     04/10/2024 0929    CO2 28 04/10/2024 0929    BUN 18 04/10/2024 0929    CREATININE 1.0 04/10/2024 0929        Component Value Date/Time    CALCIUM 9.5 04/10/2024 0929    ALKPHOS 70 04/10/2024 0929    AST 19 04/10/2024 0929    ALT 25 04/10/2024 0929    BILITOT 0.6 04/10/2024 0929            Lab Results   Component Value Date    GLUCOSE 93 04/10/2024    CALCIUM 9.5 04/10/2024     04/10/2024    K 4.5 04/10/2024    CO2 28 04/10/2024     04/10/2024    BUN 18 04/10/2024    CREATININE 1.0 04/10/2024       Lab Results   Component Value Date    CHOL 223 (H) 04/10/2024    CHOL 241 (H) 02/24/2021     Lab Results   Component Value Date    HDL 48 04/10/2024    HDL 44 02/24/2021     Lab Results   Component Value Date    LDLCALC 149 (H) 04/10/2024    LDLCALC 165 (H) 02/24/2021     Lab Results   Component Value Date    TRIG 129 04/10/2024    TRIG 175 (H) 02/24/2021     No results found for: \"CHOLHDL\"    No " "results found for: \"TSH\"    No results found for: \"HGBA1C\"    No results found for: \"HEPCAB\"      Immunization History   Administered Date(s) Administered   • Influenza TIV (IM) 11/11/2011   • Influenza Vaccine Quadrivalent Preservative Free 6 Mons and Up 11/30/2020, 11/22/2021   • Influenza Vaccine Quadrivalent Preservative Free 6-35 Months 10/14/2017, 08/14/2018   • Influenza, Unspecified 11/02/2015, 11/30/2020, 11/22/2021, 11/22/2022, 11/03/2023   • SARS-COV-2 (COVID-19) VACCINE PFIZER BIVALENT 12 years and older ([employer]) 11/22/2022   • SARS-COV-2 (COVID-19) VACCINE, Moderna Spikevax, Fall 2023 Vaccine 11/03/2023   • SARS-COV-2 (COVID19) VACCINE, PFIZER MONOVALENT 01/29/2021, 02/20/2021   • Tdap 01/01/2013, 08/14/2018, 11/22/2021          Assessment and Plan       Assessment/Plan     Problem List Items Addressed This Visit        Mental Health    ADHD (attention deficit hyperactivity disorder)    Current Assessment & Plan     --chronic and stable on meds; no change in dosage            Other    Family history of early CAD    Current Assessment & Plan     Due for CT score  Labs to be done         Relevant Orders    CT HEART CORONARY ARTERY CALCIUM SCORE WITHOUT IV CONTRAST    Encounter for general adult medical examination without abnormal findings - Primary    Current Assessment & Plan      Normal Exam.  Updated interval history.  Reviewed medications, allergies, PMH,FH,alcohol/tobacco/drug use.  Diet and exercise counseling given.   -Age appropriate health Maintenance reviewed.  -Immunizations - up to date     -Routine lab panel was ordered.      Exercise as able.   Patient verbalizes an understanding of the treatment plan discussed at today's visit.          Relevant Orders    Comprehensive metabolic panel (Completed)    Urinalysis with Reflex Culture (Completed)    CBC (Completed)    Lipid panel (Completed)       No follow-ups on file.    Orders Placed This Encounter   Procedures   • CT HEART CORONARY ARTERY " CALCIUM SCORE WITHOUT IV CONTRAST     Standing Status:   Future     Standing Expiration Date:   4/10/2025     Order Specific Question:   Release to patient     Answer:   Immediate [1]   • Comprehensive metabolic panel     Standing Status:   Future     Number of Occurrences:   1     Standing Expiration Date:   4/10/2025     Order Specific Question:   Has the patient fasted?     Answer:   Yes     Order Specific Question:   Release to patient     Answer:   Immediate [1]   • Urinalysis with Reflex Culture     Standing Status:   Future     Number of Occurrences:   1     Standing Expiration Date:   4/10/2025     Order Specific Question:   Release to patient     Answer:   Immediate [1]   • CBC     Standing Status:   Future     Number of Occurrences:   1     Standing Expiration Date:   4/10/2025     Order Specific Question:   Release to patient     Answer:   Immediate [1]   • Lipid panel     Standing Status:   Future     Number of Occurrences:   1     Standing Expiration Date:   4/10/2025     Order Specific Question:   Has the patient fasted?     Answer:   Yes     Order Specific Question:   Release to patient     Answer:   Immediate [1]          Adriana Pete DO  4/27/2024

## 2024-04-10 NOTE — ASSESSMENT & PLAN NOTE
Normal Exam.  Updated interval history.  Reviewed medications, allergies, PMH,FH,alcohol/tobacco/drug use.  Diet and exercise counseling given.   -Age appropriate health Maintenance reviewed.  -Immunizations - up to date     -Routine lab panel was ordered.      Exercise as able.   Patient verbalizes an understanding of the treatment plan discussed at today's visit.

## 2024-04-15 NOTE — RESULT ENCOUNTER NOTE
Okay to notify Artur that his electrolytes are normal.  Cholesterol is better than it was 3 years ago total cholesterol is at 223 compared to 241 LDL is about 20 points lower than 3 years ago but still elevated at 149.  His triglycerides are normal HDL is low at 48.  I would have him start to follow a low-fat low-cholesterol diet to try to keep his saturated fat intake to 30 g/day.  We can repeat his cholesterol values again in 6 months as a recheck.  At this time no statin therapy is advised but we will continue to monitor his values and if worsens statin would be indicated.    I have try to get his values down with diet and exercise.

## 2024-04-27 ASSESSMENT — ENCOUNTER SYMPTOMS
PALPITATIONS: 0
LIGHT-HEADEDNESS: 0
ABDOMINAL DISTENTION: 0
MYALGIAS: 0
FREQUENCY: 0
EYE REDNESS: 0
JOINT SWELLING: 0
WEAKNESS: 0
BACK PAIN: 0
DIARRHEA: 0
EYE ITCHING: 0
HEADACHES: 0
COUGH: 0
EYE PAIN: 0
APPETITE CHANGE: 0
SORE THROAT: 0
SLEEP DISTURBANCE: 0
CHEST TIGHTNESS: 0
AGITATION: 0
WHEEZING: 0
SINUS PAIN: 0
NERVOUS/ANXIOUS: 0
COLOR CHANGE: 0
FEVER: 0
DIZZINESS: 0
SINUS PRESSURE: 0
ARTHRALGIAS: 0
BRUISES/BLEEDS EASILY: 0
BLOOD IN STOOL: 0
ACTIVITY CHANGE: 0
FATIGUE: 0
NUMBNESS: 0
ABDOMINAL PAIN: 0
EYE DISCHARGE: 0
SHORTNESS OF BREATH: 0
UNEXPECTED WEIGHT CHANGE: 0
DYSPHORIC MOOD: 0
DIFFICULTY URINATING: 0
PHOTOPHOBIA: 0
VOICE CHANGE: 0
CONSTIPATION: 0
NAUSEA: 0
DYSURIA: 0
HYPERACTIVE: 0

## 2024-05-08 RX ORDER — DEXTROAMPHETAMINE SACCHARATE, AMPHETAMINE ASPARTATE MONOHYDRATE, DEXTROAMPHETAMINE SULFATE AND AMPHETAMINE SULFATE 2.5; 2.5; 2.5; 2.5 MG/1; MG/1; MG/1; MG/1
10 CAPSULE, EXTENDED RELEASE ORAL EVERY MORNING
Qty: 30 CAPSULE | Refills: 0 | Status: SHIPPED | OUTPATIENT
Start: 2024-05-08 | End: 2024-06-12 | Stop reason: SDUPTHER

## 2024-05-08 NOTE — TELEPHONE ENCOUNTER
"Medicine Refill Request    Last Office Visit: 4/10/2024   Last Consult Visit: Visit date not found  Last Telemedicine Visit: 1/11/2021 Adriana Pete DO    Next Appointment: Visit date not found      Current Outpatient Medications:     amphetamine-dextroamphetamine XR (ADDERALL XR) 10 mg 24 hr capsule, Take 1 capsule (10 mg total) by mouth every morning., Disp: 30 capsule, Rfl: 0      BP Readings from Last 3 Encounters:   04/10/24 132/84   02/06/23 138/72   07/25/22 124/84       Recent Lab results:  Lab Results   Component Value Date    CHOL 223 (H) 04/10/2024   ,   Lab Results   Component Value Date    HDL 48 04/10/2024   ,   Lab Results   Component Value Date    LDLCALC 149 (H) 04/10/2024   ,   Lab Results   Component Value Date    TRIG 129 04/10/2024        Lab Results   Component Value Date    GLUCOSE 93 04/10/2024   , No results found for: \"HGBA1C\"      Lab Results   Component Value Date    CREATININE 1.0 04/10/2024       No results found for: \"TSH\"      No results found for: \"HGBA1C\"    Lab Results   Component Value Date    EGFR >60.0 04/10/2024    EGFR 96 02/24/2021    EGFR 111 02/24/2021         "

## 2024-05-10 NOTE — TELEPHONE ENCOUNTER
Dispensed Days Supply Quantity Provider Pharmacy   ADDERALL XR 10 MG CAPSULE 05/09/2022 30 30 each Norma Pete DO [facility]/pharmacy #[id number] - [place]...   ADDERALL XR 10 MG CAPSULE 03/14/2022 30 30 each NORMA PETE [facility]/pharmacy #[id number] - [place]...   ADDERALL XR 10 MG CAPSULE 02/02/2022 30 30 each NORMA PETE        
PDMP please    Medicine Refill Request    Last Office: 11/22/2021   Last Consult Visit: Visit date not found  Last Telemedicine Visit: 1/11/2021 Adriana Pete DO    Next Appointment: Visit date not found      Current Outpatient Medications:   •  amphetamine-dextroamphetamine XR (ADDERALL XR) 10 mg 24 hr capsule, Take 1 capsule (10 mg total) by mouth every morning., Disp: 30 capsule, Rfl: 0      BP Readings from Last 3 Encounters:   11/22/21 134/78   06/08/21 138/86   11/30/20 128/86       Recent Lab results:  Lab Results   Component Value Date    CHOL 241 (H) 02/24/2021   ,   Lab Results   Component Value Date    HDL 44 02/24/2021   ,   Lab Results   Component Value Date    LDLCALC 165 (H) 02/24/2021   ,   Lab Results   Component Value Date    TRIG 175 (H) 02/24/2021        Lab Results   Component Value Date    GLUCOSE 90 02/24/2021   , No results found for: HGBA1C      Lab Results   Component Value Date    CREATININE 1.01 02/24/2021       No results found for: TSH          
English

## 2024-06-12 RX ORDER — DEXTROAMPHETAMINE SACCHARATE, AMPHETAMINE ASPARTATE MONOHYDRATE, DEXTROAMPHETAMINE SULFATE AND AMPHETAMINE SULFATE 2.5; 2.5; 2.5; 2.5 MG/1; MG/1; MG/1; MG/1
10 CAPSULE, EXTENDED RELEASE ORAL EVERY MORNING
Qty: 30 CAPSULE | Refills: 0 | Status: SHIPPED | OUTPATIENT
Start: 2024-06-12 | End: 2024-07-12 | Stop reason: SDUPTHER

## 2024-07-12 RX ORDER — DEXTROAMPHETAMINE SACCHARATE, AMPHETAMINE ASPARTATE MONOHYDRATE, DEXTROAMPHETAMINE SULFATE AND AMPHETAMINE SULFATE 2.5; 2.5; 2.5; 2.5 MG/1; MG/1; MG/1; MG/1
10 CAPSULE, EXTENDED RELEASE ORAL EVERY MORNING
Qty: 30 CAPSULE | Refills: 0 | Status: SHIPPED | OUTPATIENT
Start: 2024-07-12 | End: 2024-08-19 | Stop reason: SDUPTHER

## 2024-08-19 RX ORDER — DEXTROAMPHETAMINE SACCHARATE, AMPHETAMINE ASPARTATE MONOHYDRATE, DEXTROAMPHETAMINE SULFATE AND AMPHETAMINE SULFATE 2.5; 2.5; 2.5; 2.5 MG/1; MG/1; MG/1; MG/1
10 CAPSULE, EXTENDED RELEASE ORAL EVERY MORNING
Qty: 30 CAPSULE | Refills: 0 | Status: SHIPPED | OUTPATIENT
Start: 2024-08-19 | End: 2024-09-24 | Stop reason: SDUPTHER

## 2024-08-19 NOTE — TELEPHONE ENCOUNTER
"Medicine Refill Request    Last Office Visit: 4/10/2024   Last Consult Visit: Visit date not found  Last Telemedicine Visit: 1/11/2021 Adriana Pete DO    Next Appointment: Visit date not found      Current Outpatient Medications:     amphetamine-dextroamphetamine XR (ADDERALL XR) 10 mg 24 hr capsule, Take 1 capsule (10 mg total) by mouth every morning., Disp: 30 capsule, Rfl: 0      BP Readings from Last 3 Encounters:   04/10/24 132/84   02/06/23 138/72   07/25/22 124/84       Recent Lab results:  Lab Results   Component Value Date    CHOL 223 (H) 04/10/2024   ,   Lab Results   Component Value Date    HDL 48 04/10/2024   ,   Lab Results   Component Value Date    LDLCALC 149 (H) 04/10/2024   ,   Lab Results   Component Value Date    TRIG 129 04/10/2024        Lab Results   Component Value Date    GLUCOSE 93 04/10/2024   , No results found for: \"HGBA1C\"      Lab Results   Component Value Date    CREATININE 1.0 04/10/2024       No results found for: \"TSH\"      No results found for: \"HGBA1C\"  "

## 2024-09-25 RX ORDER — DEXTROAMPHETAMINE SACCHARATE, AMPHETAMINE ASPARTATE MONOHYDRATE, DEXTROAMPHETAMINE SULFATE AND AMPHETAMINE SULFATE 2.5; 2.5; 2.5; 2.5 MG/1; MG/1; MG/1; MG/1
10 CAPSULE, EXTENDED RELEASE ORAL EVERY MORNING
Qty: 30 CAPSULE | Refills: 0 | Status: SHIPPED | OUTPATIENT
Start: 2024-09-25 | End: 2024-11-11 | Stop reason: SDUPTHER

## 2024-11-12 RX ORDER — DEXTROAMPHETAMINE SACCHARATE, AMPHETAMINE ASPARTATE MONOHYDRATE, DEXTROAMPHETAMINE SULFATE AND AMPHETAMINE SULFATE 2.5; 2.5; 2.5; 2.5 MG/1; MG/1; MG/1; MG/1
10 CAPSULE, EXTENDED RELEASE ORAL EVERY MORNING
Qty: 30 CAPSULE | Refills: 0 | Status: SHIPPED | OUTPATIENT
Start: 2024-11-12 | End: 2024-12-24 | Stop reason: SDUPTHER

## 2024-12-24 RX ORDER — DEXTROAMPHETAMINE SACCHARATE, AMPHETAMINE ASPARTATE MONOHYDRATE, DEXTROAMPHETAMINE SULFATE AND AMPHETAMINE SULFATE 2.5; 2.5; 2.5; 2.5 MG/1; MG/1; MG/1; MG/1
10 CAPSULE, EXTENDED RELEASE ORAL EVERY MORNING
Qty: 30 CAPSULE | Refills: 0 | Status: SHIPPED | OUTPATIENT
Start: 2024-12-24 | End: 2025-01-23 | Stop reason: SDUPTHER

## 2024-12-24 NOTE — TELEPHONE ENCOUNTER
"Medicine Refill Request    Last Office Visit: 4/10/2024   Last Consult Visit: Visit date not found  Last Telemedicine Visit: 1/11/2021 Adraina Pete DO    Next Appointment: Visit date not found      Current Outpatient Medications:     amphetamine-dextroamphetamine XR (ADDERALL XR) 10 mg 24 hr capsule, Take 1 capsule (10 mg total) by mouth every morning., Disp: 30 capsule, Rfl: 0      BP Readings from Last 3 Encounters:   04/10/24 132/84   02/06/23 138/72   07/25/22 124/84       Recent Lab results:  Lab Results   Component Value Date    CHOL 223 (H) 04/10/2024   ,   Lab Results   Component Value Date    HDL 48 04/10/2024   ,   Lab Results   Component Value Date    LDLCALC 149 (H) 04/10/2024   ,   Lab Results   Component Value Date    TRIG 129 04/10/2024        Lab Results   Component Value Date    GLUCOSE 93 04/10/2024   , No results found for: \"HGBA1C\"      Lab Results   Component Value Date    CREATININE 1.0 04/10/2024       No results found for: \"TSH\"      No results found for: \"HGBA1C\"    Lab Results   Component Value Date    EGFR >60.0 04/10/2024    EGFR 96 02/24/2021    EGFR 111 02/24/2021      "

## 2025-01-26 RX ORDER — DEXTROAMPHETAMINE SACCHARATE, AMPHETAMINE ASPARTATE MONOHYDRATE, DEXTROAMPHETAMINE SULFATE AND AMPHETAMINE SULFATE 2.5; 2.5; 2.5; 2.5 MG/1; MG/1; MG/1; MG/1
10 CAPSULE, EXTENDED RELEASE ORAL EVERY MORNING
Qty: 30 CAPSULE | Refills: 0 | Status: SHIPPED | OUTPATIENT
Start: 2025-01-26 | End: 2025-02-26 | Stop reason: SDUPTHER

## 2025-02-05 ENCOUNTER — TELEPHONE (OUTPATIENT)
Dept: PRIMARY CARE | Facility: CLINIC | Age: 40
End: 2025-02-05

## 2025-02-05 ENCOUNTER — OFFICE VISIT (OUTPATIENT)
Dept: PRIMARY CARE | Facility: CLINIC | Age: 40
End: 2025-02-05
Payer: COMMERCIAL

## 2025-02-05 VITALS
HEIGHT: 71 IN | BODY MASS INDEX: 33.91 KG/M2 | HEART RATE: 79 BPM | DIASTOLIC BLOOD PRESSURE: 88 MMHG | WEIGHT: 242.2 LBS | SYSTOLIC BLOOD PRESSURE: 146 MMHG | RESPIRATION RATE: 15 BRPM | TEMPERATURE: 97.7 F | OXYGEN SATURATION: 99 %

## 2025-02-05 DIAGNOSIS — E66.811 CLASS 1 DRUG-INDUCED OBESITY WITHOUT SERIOUS COMORBIDITY WITH BODY MASS INDEX (BMI) OF 33.0 TO 33.9 IN ADULT: Primary | ICD-10-CM

## 2025-02-05 DIAGNOSIS — E66.1 CLASS 1 DRUG-INDUCED OBESITY WITHOUT SERIOUS COMORBIDITY WITH BODY MASS INDEX (BMI) OF 33.0 TO 33.9 IN ADULT: Primary | ICD-10-CM

## 2025-02-05 DIAGNOSIS — F90.2 ATTENTION DEFICIT HYPERACTIVITY DISORDER (ADHD), COMBINED TYPE: ICD-10-CM

## 2025-02-05 DIAGNOSIS — Z98.52 VASECTOMY STATUS: ICD-10-CM

## 2025-02-05 PROCEDURE — 3008F BODY MASS INDEX DOCD: CPT | Performed by: FAMILY MEDICINE

## 2025-02-05 PROCEDURE — 99214 OFFICE O/P EST MOD 30 MIN: CPT | Performed by: FAMILY MEDICINE

## 2025-02-05 RX ORDER — SEMAGLUTIDE 0.25 MG/.5ML
0.25 INJECTION, SOLUTION SUBCUTANEOUS
Qty: 2 ML | Refills: 1 | Status: SHIPPED | OUTPATIENT
Start: 2025-02-05 | End: 2025-04-02

## 2025-02-05 ASSESSMENT — ENCOUNTER SYMPTOMS
SHORTNESS OF BREATH: 0
DECREASED CONCENTRATION: 1
CHEST TIGHTNESS: 0
ACTIVITY CHANGE: 0
APPETITE CHANGE: 0
UNEXPECTED WEIGHT CHANGE: 0
PALPITATIONS: 0
GASTROINTESTINAL NEGATIVE: 1
COUGH: 0
MUSCULOSKELETAL NEGATIVE: 1

## 2025-02-05 NOTE — ASSESSMENT & PLAN NOTE
BMI 33.78, 242 lbs  Struggling to lose weight  -Exercises regularly, plays hockey,  and does [place] 3-4 times a week for 30mins for the last 3 mos consistently  --lost a couple pounds    Not on nutritionist or weight loss program  -Did Whole30 for the entirety of October but no longer on it    Discussed GLP1s, side effects, and instructions  No thyroid cancer, sleep apnea, and not allergic to GLP components    Goal weight: 210lbs  Start Wegovy 0.25 mg injections every seven days. Pt will call me after 1 month to discuss dose adjustment  Gave referral to Judith Guo

## 2025-02-05 NOTE — PROGRESS NOTES
Adriana Pete D.O.  [facility]  [address]  [place] [address]  Phone: [phone]  Fax: [phone]     History of Present Illness       Patient ID: Artur Soni is a 39 y.o. male.    Subjective: Weight Loss Discussion     HPI   Pt is here for follow up nd interst in weight loss meds.   -Reports weight stagnation --fluctuating between 240-250lbs  -Tries to be consistent with diet despite traveling twice a week.  [place] 3-4 times per week for the last 3 mos.  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  Interested in taking this med.    -Chronic ADHD --well controlled by meds    Interest to see urology--vasectomy n future         Past Medical/Surgical/Family/Social History     The following have been reviewed and updated as appropriate in this visit:   Meds  Problems         Past Medical History:   Diagnosis Date    ADHD (attention deficit hyperactivity disorder)     Gastroesophageal reflux disease without esophagitis 11/22/2021       Past Surgical History   Procedure Laterality Date    Knee arthroscopy w/ meniscal repair Right        Family History   Problem Relation Name Age of Onset    No Known Problems Biological Mother      Heart disease Biological Father      Valvular heart disease Biological Father      Early death Biological Father      No Known Problems Biological Sister      No Known Problems Maternal Grandmother      Parkinsonism Maternal Grandfather      Parkinsonism Paternal Grandmother      Dementia Paternal Grandmother      No Known Problems Paternal Grandfather      Heart disease Mother's Sister      No Known Problems Biological Son      No Known Problems Biological Daughter         Social History     Tobacco Use    Smoking status: Never    Smokeless tobacco: Never   Vaping Use    Vaping status: Never Used   Substance Use Topics    Alcohol use: Yes     Alcohol/week: 5.0 standard drinks of alcohol     Types: 5 Standard  "drinks or equivalent per week     Comment: Socially, about 5 drinks per week    Drug use: Never       Patient Care Team:  Adriana Pete DO as PCP - General (Family Medicine)   Allergies and Medications     No Known Allergies    Current Outpatient Medications   Medication Sig Dispense Refill    amphetamine-dextroamphetamine XR (ADDERALL XR) 10 mg 24 hr capsule Take 1 capsule (10 mg total) by mouth every morning. 30 capsule 0    semaglutide (WEGOVY) 0.25 mg/0.5 mL subcutaneous injection Inject 0.25 mg under the skin every (seven) 7 days. 2 mL 1     No current facility-administered medications for this visit.      Review of Systems       Review of Systems   Constitutional:  Negative for activity change, appetite change and unexpected weight change.   Respiratory:  Negative for cough, chest tightness and shortness of breath.    Cardiovascular:  Negative for chest pain, palpitations and leg swelling.   Gastrointestinal: Negative.    Genitourinary: Negative.    Musculoskeletal: Negative.    Psychiatric/Behavioral:  Positive for decreased concentration. Negative for agitation, confusion, dysphoric mood and sleep disturbance. The patient is nervous/anxious.         Managed by meds      Physical Examination       Objective     Vitals:    02/05/25 1124   BP: (!) 146/88   Pulse: 79   Resp: 15   Temp: 36.5 °C (97.7 °F)   SpO2: 99%       Vitals:    02/05/25 1124   BP: (!) 146/88   BP Location: Left upper arm   Patient Position: Sitting   Pulse: 79   Resp: 15   Temp: 36.5 °C (97.7 °F)   TempSrc: Temporal   SpO2: 99%   Weight: 110 kg (242 lb 3.2 oz)   Height: 1.803 m (5' 11\")       Wt Readings from Last 5 Encounters:   02/05/25 110 kg (242 lb 3.2 oz)   04/10/24 109 kg (239 lb 12.8 oz)   02/06/23 109 kg (241 lb 6.4 oz)   07/25/22 106 kg (233 lb 3.2 oz)   11/22/21 110 kg (243 lb)       Ht Readings from Last 5 Encounters:   02/05/25 1.803 m (5' 11\")   04/10/24 1.829 m (6')   02/06/23 1.829 m (6')   07/25/22 1.829 m (6') " "  11/22/21 1.816 m (5' 11.5\")       BP Readings from Last 5 Encounters:   02/05/25 (!) 146/88   04/10/24 132/84   02/06/23 138/72   07/25/22 124/84   11/22/21 134/78         Physical Exam  HENT:      Head: Normocephalic and atraumatic.      Right Ear: Tympanic membrane, ear canal and external ear normal.      Left Ear: Tympanic membrane, ear canal and external ear normal.      Nose: Nose normal.      Mouth/Throat:      Mouth: Mucous membranes are moist.      Pharynx: Oropharynx is clear.   Eyes:      Extraocular Movements: Extraocular movements intact.      Conjunctiva/sclera: Conjunctivae normal.      Pupils: Pupils are equal, round, and reactive to light.   Cardiovascular:      Rate and Rhythm: Normal rate and regular rhythm.      Pulses: Normal pulses.      Heart sounds: Normal heart sounds.   Pulmonary:      Effort: Pulmonary effort is normal.      Breath sounds: Normal breath sounds.   Musculoskeletal:      Cervical back: Normal range of motion and neck supple.   Neurological:      Mental Status: He is alert.   Psychiatric:         Mood and Affect: Mood normal.         Behavior: Behavior normal.         Thought Content: Thought content normal.         Judgment: Judgment normal.          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 Pt will " call me after 1 month to discuss dose adjustment  Gave referral to [facility]         Relevant Medications    semaglutide (WEGOVY) 0.25 mg/0.5 mL subcutaneous injection    Other Relevant Orders    Ambulatory referral to [facility]       Mental Health    ADHD (attention deficit hyperactivity disorder)    Current Assessment & Plan     Doing well on Adderall, no change in meds         Relevant Medications    semaglutide (WEGOVY) 0.25 mg/0.5 mL subcutaneous injection       Other    Vasectomy status    Current Assessment & Plan     Reports considering vasectomy  Gave referral to Urology Dr. Artur Charles         Relevant Orders    Ambulatory referral to Urology       No follow-ups on file.    Orders Placed This Encounter   Procedures    Ambulatory referral to [facility]     Referral Priority:   Routine     Referral Type:   Health Education     Referral Reason:   Specialty Services Required     Referral Location:   [facility]     Requested Specialty:   Nutrition and Diabetes     Number of Visits Requested:   10    Ambulatory referral to Urology     Standing Status:   Future     Standing Expiration Date:   8/5/2025     Referral Priority:   Routine     Referral Type:   Consultation     Referral Reason:   Specialty Services Required     Referred to Provider:   Artur Charles MD     Requested Specialty:   Urology     Number of Visits Requested:   10             NOTE: This document was generated utilizing dictation software, typographical errors may be present.  Please contact me directly for anything that seems abnormal for clarification.      Adriana Pete, DO  2/19/2025

## 2025-02-12 ENCOUNTER — TELEPHONE (OUTPATIENT)
Dept: PRIMARY CARE | Facility: CLINIC | Age: 40
End: 2025-02-12
Payer: COMMERCIAL

## 2025-02-12 NOTE — TELEPHONE ENCOUNTER
Augusta  Can you please give Venessa call and let him know that we did get a response on the Wegovy medication and unfortunately they are only covering this medication if he has a BMI equal or greater than 40.  Unfortunately his BMI is at 33 I still would like him to continue to work with his weight loss and I can also offer him a discussion and structured weight loss program with the nutritionist if he is interested.    His insurance company unfortunately is not interested in any other type of treatment unless the BMI is at 40.  It is a real shame.

## 2025-02-19 ASSESSMENT — ENCOUNTER SYMPTOMS
AGITATION: 0
DYSPHORIC MOOD: 0
CONFUSION: 0
NERVOUS/ANXIOUS: 1
SLEEP DISTURBANCE: 0

## 2025-02-26 RX ORDER — DEXTROAMPHETAMINE SACCHARATE, AMPHETAMINE ASPARTATE MONOHYDRATE, DEXTROAMPHETAMINE SULFATE AND AMPHETAMINE SULFATE 2.5; 2.5; 2.5; 2.5 MG/1; MG/1; MG/1; MG/1
10 CAPSULE, EXTENDED RELEASE ORAL EVERY MORNING
Qty: 30 CAPSULE | Refills: 0 | Status: SHIPPED | OUTPATIENT
Start: 2025-02-26 | End: 2025-03-28

## 2025-02-26 NOTE — TELEPHONE ENCOUNTER
"Medicine Refill Request    Last Office Visit: 2/5/2025   Last Consult Visit: Visit date not found  Last Telemedicine Visit: Visit date not found    Next Appointment: 3/26/2025      Current Outpatient Medications:     amphetamine-dextroamphetamine XR (ADDERALL XR) 10 mg 24 hr capsule, Take 1 capsule (10 mg total) by mouth every morning., Disp: 30 capsule, Rfl: 0    semaglutide (WEGOVY) 0.25 mg/0.5 mL subcutaneous injection, Inject 0.25 mg under the skin every (seven) 7 days., Disp: 2 mL, Rfl: 1      BP Readings from Last 3 Encounters:   02/05/25 (!) 146/88   04/10/24 132/84   02/06/23 138/72       Recent Lab results:  Lab Results   Component Value Date    CHOL 223 (H) 04/10/2024   ,   Lab Results   Component Value Date    HDL 48 04/10/2024   ,   Lab Results   Component Value Date    LDLCALC 149 (H) 04/10/2024   ,   Lab Results   Component Value Date    TRIG 129 04/10/2024        Lab Results   Component Value Date    GLUCOSE 93 04/10/2024   , No results found for: \"HGBA1C\"      Lab Results   Component Value Date    CREATININE 1.0 04/10/2024       No results found for: \"TSH\"      No results found for: \"HGBA1C\"    Lab Results   Component Value Date    EGFR >60.0 04/10/2024    EGFR 96 02/24/2021    EGFR 111 02/24/2021         "

## 2025-04-24 RX ORDER — DEXTROAMPHETAMINE SACCHARATE, AMPHETAMINE ASPARTATE MONOHYDRATE, DEXTROAMPHETAMINE SULFATE AND AMPHETAMINE SULFATE 2.5; 2.5; 2.5; 2.5 MG/1; MG/1; MG/1; MG/1
10 CAPSULE, EXTENDED RELEASE ORAL EVERY MORNING
Qty: 30 CAPSULE | Refills: 0 | Status: SHIPPED | OUTPATIENT
Start: 2025-04-24 | End: 2025-05-24

## 2025-04-24 NOTE — TELEPHONE ENCOUNTER
"Medicine Refill Request    Last Office Visit: 2/5/2025   Last Consult Visit: Visit date not found  Last Telemedicine Visit: Visit date not found    Next Appointment: 5/1/2025      Current Outpatient Medications:     amphetamine-dextroamphetamine XR (ADDERALL XR) 10 mg 24 hr capsule, Take 1 capsule (10 mg total) by mouth every morning., Disp: 30 capsule, Rfl: 0      BP Readings from Last 3 Encounters:   02/05/25 (!) 146/88   04/10/24 132/84   02/06/23 138/72       Recent Lab results:  Lab Results   Component Value Date    CHOL 223 (H) 04/10/2024   ,   Lab Results   Component Value Date    HDL 48 04/10/2024   ,   Lab Results   Component Value Date    LDLCALC 149 (H) 04/10/2024   ,   Lab Results   Component Value Date    TRIG 129 04/10/2024        Lab Results   Component Value Date    GLUCOSE 93 04/10/2024   , No results found for: \"HGBA1C\"      Lab Results   Component Value Date    CREATININE 1.0 04/10/2024       No results found for: \"TSH\"      No results found for: \"HGBA1C\"    Lab Results   Component Value Date    EGFR >60.0 04/10/2024    EGFR 96 02/24/2021    EGFR 111 02/24/2021         "

## 2025-05-01 ENCOUNTER — OFFICE VISIT (OUTPATIENT)
Dept: PRIMARY CARE | Facility: CLINIC | Age: 40
End: 2025-05-01
Payer: COMMERCIAL

## 2025-05-01 VITALS
WEIGHT: 226.4 LBS | HEIGHT: 71 IN | DIASTOLIC BLOOD PRESSURE: 76 MMHG | RESPIRATION RATE: 15 BRPM | HEART RATE: 77 BPM | OXYGEN SATURATION: 98 % | BODY MASS INDEX: 31.69 KG/M2 | TEMPERATURE: 98 F | SYSTOLIC BLOOD PRESSURE: 126 MMHG

## 2025-05-01 DIAGNOSIS — F90.2 ATTENTION DEFICIT HYPERACTIVITY DISORDER (ADHD), COMBINED TYPE: Primary | ICD-10-CM

## 2025-05-01 DIAGNOSIS — E66.1 CLASS 1 DRUG-INDUCED OBESITY WITHOUT SERIOUS COMORBIDITY WITH BODY MASS INDEX (BMI) OF 33.0 TO 33.9 IN ADULT: ICD-10-CM

## 2025-05-01 DIAGNOSIS — E66.811 CLASS 1 DRUG-INDUCED OBESITY WITHOUT SERIOUS COMORBIDITY WITH BODY MASS INDEX (BMI) OF 33.0 TO 33.9 IN ADULT: ICD-10-CM

## 2025-05-01 DIAGNOSIS — K59.03 DRUG-INDUCED CONSTIPATION: ICD-10-CM

## 2025-05-01 DIAGNOSIS — E78.2 MIXED HYPERLIPIDEMIA: ICD-10-CM

## 2025-05-01 PROCEDURE — 99213 OFFICE O/P EST LOW 20 MIN: CPT | Performed by: FAMILY MEDICINE

## 2025-05-01 PROCEDURE — 3008F BODY MASS INDEX DOCD: CPT | Performed by: FAMILY MEDICINE

## 2025-06-06 NOTE — TELEPHONE ENCOUNTER
"Medicine Refill Request    Last Office Visit: 5/1/2025   Last Consult Visit: Visit date not found  Last Telemedicine Visit: Visit date not found    Next Appointment: Visit date not found      Current Outpatient Medications:     amphetamine-dextroamphetamine XR (ADDERALL XR) 10 mg 24 hr capsule, Take 1 capsule (10 mg total) by mouth every morning., Disp: 30 capsule, Rfl: 0    tirzepatide, weight loss, (ZEPBOUND) 5 mg/0.5 mL subcutaneous PEN injector, Inject 5 mg under the skin every (seven) 7 days., Disp: , Rfl:       BP Readings from Last 3 Encounters:   05/01/25 126/76   02/05/25 (!) 146/88   04/10/24 132/84       Recent Lab results:  Lab Results   Component Value Date    CHOL 223 (H) 04/10/2024   ,   Lab Results   Component Value Date    HDL 48 04/10/2024   ,   Lab Results   Component Value Date    LDLCALC 149 (H) 04/10/2024   ,   Lab Results   Component Value Date    TRIG 129 04/10/2024        Lab Results   Component Value Date    GLUCOSE 93 04/10/2024   , No results found for: \"HGBA1C\"      Lab Results   Component Value Date    CREATININE 1.0 04/10/2024       No results found for: \"TSH\"      No results found for: \"HGBA1C\"    Lab Results   Component Value Date    EGFR >60.0 04/10/2024    EGFR 96 02/24/2021    EGFR 111 02/24/2021         "

## 2025-06-07 RX ORDER — DEXTROAMPHETAMINE SACCHARATE, AMPHETAMINE ASPARTATE MONOHYDRATE, DEXTROAMPHETAMINE SULFATE AND AMPHETAMINE SULFATE 2.5; 2.5; 2.5; 2.5 MG/1; MG/1; MG/1; MG/1
10 CAPSULE, EXTENDED RELEASE ORAL EVERY MORNING
Qty: 30 CAPSULE | Refills: 0 | Status: SHIPPED | OUTPATIENT
Start: 2025-06-07 | End: 2025-07-07

## 2025-08-07 RX ORDER — DEXTROAMPHETAMINE SACCHARATE, AMPHETAMINE ASPARTATE MONOHYDRATE, DEXTROAMPHETAMINE SULFATE AND AMPHETAMINE SULFATE 2.5; 2.5; 2.5; 2.5 MG/1; MG/1; MG/1; MG/1
10 CAPSULE, EXTENDED RELEASE ORAL EVERY MORNING
Qty: 30 CAPSULE | Refills: 0 | Status: SHIPPED | OUTPATIENT
Start: 2025-08-07 | End: 2025-09-06